# Patient Record
Sex: FEMALE | Race: WHITE | Employment: FULL TIME | URBAN - NONMETROPOLITAN AREA
[De-identification: names, ages, dates, MRNs, and addresses within clinical notes are randomized per-mention and may not be internally consistent; named-entity substitution may affect disease eponyms.]

---

## 2019-03-26 ENCOUNTER — TELEPHONE (OUTPATIENT)
Dept: SURGERY | Age: 49
End: 2019-03-26

## 2019-03-26 NOTE — TELEPHONE ENCOUNTER
Called patient to schedule appt. Ref from her employer for gallbladder. Left message, [facility]. Put with any provider.

## 2020-01-21 ENCOUNTER — OFFICE VISIT (OUTPATIENT)
Dept: FAMILY MEDICINE CLINIC | Age: 50
End: 2020-01-21
Payer: COMMERCIAL

## 2020-01-21 VITALS
BODY MASS INDEX: 24.17 KG/M2 | WEIGHT: 128 LBS | OXYGEN SATURATION: 97 % | TEMPERATURE: 98.2 F | SYSTOLIC BLOOD PRESSURE: 110 MMHG | HEIGHT: 61 IN | HEART RATE: 75 BPM | RESPIRATION RATE: 18 BRPM | DIASTOLIC BLOOD PRESSURE: 68 MMHG

## 2020-01-21 PROCEDURE — 96372 THER/PROPH/DIAG INJ SC/IM: CPT | Performed by: NURSE PRACTITIONER

## 2020-01-21 PROCEDURE — G8427 DOCREV CUR MEDS BY ELIG CLIN: HCPCS | Performed by: NURSE PRACTITIONER

## 2020-01-21 PROCEDURE — 1036F TOBACCO NON-USER: CPT | Performed by: NURSE PRACTITIONER

## 2020-01-21 PROCEDURE — G8484 FLU IMMUNIZE NO ADMIN: HCPCS | Performed by: NURSE PRACTITIONER

## 2020-01-21 PROCEDURE — G8420 CALC BMI NORM PARAMETERS: HCPCS | Performed by: NURSE PRACTITIONER

## 2020-01-21 PROCEDURE — 99202 OFFICE O/P NEW SF 15 MIN: CPT | Performed by: NURSE PRACTITIONER

## 2020-01-21 RX ORDER — OMEPRAZOLE 20 MG/1
20 CAPSULE, DELAYED RELEASE ORAL DAILY
COMMUNITY
Start: 2019-02-26

## 2020-01-21 RX ORDER — SUMATRIPTAN 100 MG/1
100 TABLET, FILM COATED ORAL
COMMUNITY
Start: 2019-12-14 | End: 2022-07-21 | Stop reason: SDUPTHER

## 2020-01-21 RX ORDER — KETOROLAC TROMETHAMINE 30 MG/ML
30 INJECTION, SOLUTION INTRAMUSCULAR; INTRAVENOUS ONCE
Status: COMPLETED | OUTPATIENT
Start: 2020-01-21 | End: 2020-01-21

## 2020-01-21 RX ORDER — METHOCARBAMOL 750 MG/1
TABLET ORAL
COMMUNITY
Start: 2020-01-18 | End: 2022-05-05

## 2020-01-21 RX ADMIN — KETOROLAC TROMETHAMINE 30 MG: 30 INJECTION, SOLUTION INTRAMUSCULAR; INTRAVENOUS at 08:43

## 2020-01-21 ASSESSMENT — ENCOUNTER SYMPTOMS
EYE DISCHARGE: 0
TROUBLE SWALLOWING: 0
VOMITING: 0
BLOOD IN STOOL: 0
SORE THROAT: 0
COLOR CHANGE: 0
CONSTIPATION: 0
SHORTNESS OF BREATH: 0
ABDOMINAL PAIN: 1
DIARRHEA: 0
ABDOMINAL DISTENTION: 0
NAUSEA: 1
COUGH: 0
RHINORRHEA: 0
EYE REDNESS: 0

## 2020-01-21 ASSESSMENT — PATIENT HEALTH QUESTIONNAIRE - PHQ9
SUM OF ALL RESPONSES TO PHQ QUESTIONS 1-9: 0
1. LITTLE INTEREST OR PLEASURE IN DOING THINGS: 0
SUM OF ALL RESPONSES TO PHQ9 QUESTIONS 1 & 2: 0
2. FEELING DOWN, DEPRESSED OR HOPELESS: 0
SUM OF ALL RESPONSES TO PHQ QUESTIONS 1-9: 0

## 2020-01-21 NOTE — LETTER
[address] [place] [address] [address] [place] [address]  Phone: [phone]  Fax: [phone] VANE Aggarwal - CNP        January 21, 2020     Patient: Joan Mckeon   YOB: 1970   Date of Visit: 1/21/2020       To Whom it May Concern:    Kt Hein was seen in my clinic on 1/21/2020. If you have any questions or concerns, please don't hesitate to call.     Sincerely,         VANE Mccormick CNP

## 2022-05-05 ENCOUNTER — OFFICE VISIT (OUTPATIENT)
Dept: FAMILY MEDICINE CLINIC | Age: 52
End: 2022-05-05
Payer: COMMERCIAL

## 2022-05-05 VITALS
DIASTOLIC BLOOD PRESSURE: 62 MMHG | OXYGEN SATURATION: 98 % | BODY MASS INDEX: 23.82 KG/M2 | RESPIRATION RATE: 16 BRPM | SYSTOLIC BLOOD PRESSURE: 122 MMHG | WEIGHT: 124 LBS | HEART RATE: 94 BPM | TEMPERATURE: 97.3 F

## 2022-05-05 DIAGNOSIS — Z13.220 SCREENING, LIPID: ICD-10-CM

## 2022-05-05 DIAGNOSIS — R10.11 RIGHT UPPER QUADRANT PAIN: Primary | ICD-10-CM

## 2022-05-05 DIAGNOSIS — Z11.59 ENCOUNTER FOR HEPATITIS C SCREENING TEST FOR LOW RISK PATIENT: ICD-10-CM

## 2022-05-05 DIAGNOSIS — Z11.4 SCREENING FOR HIV (HUMAN IMMUNODEFICIENCY VIRUS): ICD-10-CM

## 2022-05-05 PROBLEM — F32.A DEPRESSION: Status: ACTIVE | Noted: 2022-05-05

## 2022-05-05 PROBLEM — K83.9 BILIARY DISEASE: Status: ACTIVE | Noted: 2022-05-05

## 2022-05-05 PROBLEM — F41.9 ANXIETY: Status: ACTIVE | Noted: 2022-05-05

## 2022-05-05 PROBLEM — R12 HEARTBURN: Status: ACTIVE | Noted: 2022-05-05

## 2022-05-05 PROBLEM — G43.009 MIGRAINE WITHOUT AURA AND WITHOUT STATUS MIGRAINOSUS, NOT INTRACTABLE: Status: ACTIVE | Noted: 2022-05-05

## 2022-05-05 LAB
BASOPHILS # BLD: 1.7 %
BASOPHILS ABSOLUTE: 0.1 THOU/MM3 (ref 0–0.1)
CHOLESTEROL, TOTAL: 178 MG/DL (ref 100–199)
EOSINOPHIL # BLD: 1.1 %
EOSINOPHILS ABSOLUTE: 0.1 THOU/MM3 (ref 0–0.4)
ERYTHROCYTE [DISTWIDTH] IN BLOOD BY AUTOMATED COUNT: 14.6 % (ref 11.5–14.5)
ERYTHROCYTE [DISTWIDTH] IN BLOOD BY AUTOMATED COUNT: 39.8 FL (ref 35–45)
HCT VFR BLD CALC: 33.3 % (ref 37–47)
HDLC SERPL-MCNC: 77 MG/DL
HEMOGLOBIN: 9.7 GM/DL (ref 12–16)
IMMATURE GRANS (ABS): 0.01 THOU/MM3 (ref 0–0.07)
IMMATURE GRANULOCYTES: 0.2 %
LDL CHOLESTEROL CALCULATED: 74 MG/DL
LYMPHOCYTES # BLD: 35.6 %
LYMPHOCYTES ABSOLUTE: 1.9 THOU/MM3 (ref 1–4.8)
MCH RBC QN AUTO: 22.1 PG (ref 26–33)
MCHC RBC AUTO-ENTMCNC: 29.1 GM/DL (ref 32.2–35.5)
MCV RBC AUTO: 76 FL (ref 81–99)
MONOCYTES # BLD: 9.2 %
MONOCYTES ABSOLUTE: 0.5 THOU/MM3 (ref 0.4–1.3)
NUCLEATED RED BLOOD CELLS: 0 /100 WBC
PLATELET # BLD: 438 THOU/MM3 (ref 130–400)
PMV BLD AUTO: 10.9 FL (ref 9.4–12.4)
RBC # BLD: 4.38 MILL/MM3 (ref 4.2–5.4)
SEG NEUTROPHILS: 52.2 %
SEGMENTED NEUTROPHILS ABSOLUTE COUNT: 2.7 THOU/MM3 (ref 1.8–7.7)
TRIGL SERPL-MCNC: 134 MG/DL (ref 0–199)
WBC # BLD: 5.2 THOU/MM3 (ref 4.8–10.8)

## 2022-05-05 PROCEDURE — 1036F TOBACCO NON-USER: CPT | Performed by: FAMILY MEDICINE

## 2022-05-05 PROCEDURE — G8420 CALC BMI NORM PARAMETERS: HCPCS | Performed by: FAMILY MEDICINE

## 2022-05-05 PROCEDURE — 96372 THER/PROPH/DIAG INJ SC/IM: CPT | Performed by: FAMILY MEDICINE

## 2022-05-05 PROCEDURE — 36415 COLL VENOUS BLD VENIPUNCTURE: CPT | Performed by: FAMILY MEDICINE

## 2022-05-05 PROCEDURE — G8427 DOCREV CUR MEDS BY ELIG CLIN: HCPCS | Performed by: FAMILY MEDICINE

## 2022-05-05 PROCEDURE — 99214 OFFICE O/P EST MOD 30 MIN: CPT | Performed by: FAMILY MEDICINE

## 2022-05-05 RX ORDER — KETOROLAC TROMETHAMINE 30 MG/ML
60 INJECTION, SOLUTION INTRAMUSCULAR; INTRAVENOUS ONCE
Status: COMPLETED | OUTPATIENT
Start: 2022-05-05 | End: 2022-05-05

## 2022-05-05 RX ORDER — KETOROLAC TROMETHAMINE 10 MG/1
10 TABLET, FILM COATED ORAL EVERY 6 HOURS PRN
Qty: 20 TABLET | Refills: 0 | Status: SHIPPED | OUTPATIENT
Start: 2022-05-05 | End: 2022-07-21

## 2022-05-05 RX ADMIN — KETOROLAC TROMETHAMINE 60 MG: 30 INJECTION, SOLUTION INTRAMUSCULAR; INTRAVENOUS at 15:22

## 2022-05-05 ASSESSMENT — PATIENT HEALTH QUESTIONNAIRE - PHQ9
SUM OF ALL RESPONSES TO PHQ9 QUESTIONS 1 & 2: 0
SUM OF ALL RESPONSES TO PHQ QUESTIONS 1-9: 0
2. FEELING DOWN, DEPRESSED OR HOPELESS: 0
SUM OF ALL RESPONSES TO PHQ QUESTIONS 1-9: 0
1. LITTLE INTEREST OR PLEASURE IN DOING THINGS: 0

## 2022-05-05 ASSESSMENT — ENCOUNTER SYMPTOMS
SINUS PAIN: 0
BACK PAIN: 0
SHORTNESS OF BREATH: 0
ABDOMINAL PAIN: 1
BLOOD IN STOOL: 0
NAUSEA: 0
SINUS PRESSURE: 0
ABDOMINAL DISTENTION: 0
VOMITING: 0
DIARRHEA: 0
RHINORRHEA: 0
COUGH: 0
SORE THROAT: 0
WHEEZING: 0
CONSTIPATION: 0

## 2022-05-05 NOTE — PROGRESS NOTES
After obtaining consent, and per orders of Dr. Erlin Chairez, injection of Toradol 60mg given in Left upper quad. gluteus by Dann Ledesma CMA. Patient instructed to report any adverse reaction to me immediately. Administrations This Visit     ketorolac (TORADOL) injection 60 mg     Admin Date  05/05/2022  15:22 Action  Given Dose  60 mg Route  IntraMUSCular Site  Dorsogluteal Left Administered By  Nela Lara    Ordering Provider: Felix Lai MD    NDC: 9182-3252-22    Lot#: [id number]    : VINITA/ [place] 9    Patient Supplied?: No              Patient filled out VIS checklist and received VIS on vaccine. Patient tolerated well and denied any other questions or concerns at this time.

## 2022-05-05 NOTE — PROGRESS NOTES
Sydney Jorge (:  1970) is a 54 y.o. female,New patient, here for evaluation of the following chief complaint(s):  Abdominal Pain (right side radiates across stomach. getting worse throughout the day)         ASSESSMENT/PLAN:  1. Right upper quadrant pain  -     US GALLBLADDER RUQ; Future  -     CBC with Auto Differential; Future  -     Comprehensive Metabolic Panel; Future  -     Lipase; Future  -     ketorolac (TORADOL) 10 MG tablet; Take 1 tablet by mouth every 6 hours as needed for Pain, Disp-20 tablet, R-0Normal  -     ketorolac (TORADOL) injection 60 mg; 60 mg, IntraMUSCular, ONCE, 1 dose, On Thu 22 at 1530Do not administer for more than 5 days. 2. Screening, lipid  -     Lipid Panel; Future  3. Encounter for hepatitis C screening test for low risk patient  -     Hepatitis C Antibody; Future  4. Screening for HIV (human immunodeficiency virus)  -     HIV Screen; Future      Will send patient for stat RUQ ultrasound and get labs stat as well. Will give Toradol injection and start toradol 10mg q6h prn for pain. Return in about 1 week (around 2022) for follow up. Subjective   SUBJECTIVE/OBJECTIVE:  HPI     Patient presents today for walk-in for abdominal pain. CT 4/15 showed no acute process in abdomen or pelvis, unremarkable gallbladder and appendix, no urolithiasis or urinary tract dilatation, left hepatic hemangioma unchanged, moderate-sized hiatal hernia, status post hysterectomy and gastric bypass surgery. Additionally was seen in the ED at [facility] on  for abdominal pain but left without being seen. States in process of gall bladder attack and she wants to get ahead of it. She had gall bladder test done and they said it was only working about 30%. She went to surgeon who said they wanted to wait due to her previous surgeries. Past three weeks this is the second time it's happened.  She states it's been colicky off and on but not like the spasms she's having now. She has tried bentyl but it doesn't. Pain is always in right upper side. When it goes into full spasm it shoots across her stomach. 3 weeks ago she got shot of toradol in [facility] and pain subsided. Is interested in getting referral to get another opinion on surgery. Had gastric bypass in [place]. States she is in between family docs, would like to establish. Usually goes to [facility]. Thinks last time she got labwork done was last year (yearly labs for work). Review of Systems   Constitutional: Negative for chills and fever. HENT: Negative for congestion, rhinorrhea, sinus pressure, sinus pain and sore throat. Respiratory: Negative for cough, shortness of breath and wheezing. Cardiovascular: Negative for chest pain and leg swelling. Gastrointestinal: Positive for abdominal pain. Negative for abdominal distention, blood in stool, constipation, diarrhea, nausea and vomiting. Genitourinary: Negative for dysuria, frequency, hematuria and urgency. Musculoskeletal: Negative for arthralgias, back pain and myalgias. Skin: Negative for rash. Neurological: Negative for dizziness, light-headedness and headaches. Psychiatric/Behavioral: Negative for dysphoric mood and sleep disturbance. Objective   Physical Exam  Constitutional:       General: She is not in acute distress. Appearance: Normal appearance. She is not ill-appearing. HENT:      Head: Normocephalic and atraumatic. Right Ear: External ear normal.      Left Ear: External ear normal.      Nose: Nose normal.      Mouth/Throat:      Mouth: Mucous membranes are moist.      Pharynx: Oropharynx is clear. Eyes:      Conjunctiva/sclera: Conjunctivae normal.   Cardiovascular:      Rate and Rhythm: Normal rate and regular rhythm. Pulses: Normal pulses. Heart sounds: Normal heart sounds. No murmur heard.       Pulmonary:      Effort: Pulmonary effort is normal. No respiratory distress. Breath sounds: Normal breath sounds. No wheezing. Abdominal:      General: Abdomen is flat. Bowel sounds are normal. There is no distension. Palpations: Abdomen is soft. There is no hepatomegaly or splenomegaly. Tenderness: There is generalized abdominal tenderness and tenderness in the right upper quadrant, epigastric area and left upper quadrant. There is guarding. There is no rebound. Positive signs include Shaw's sign. Negative signs include McBurney's sign. Musculoskeletal:         General: Normal range of motion. Cervical back: Normal range of motion and neck supple. Skin:     General: Skin is warm. Neurological:      General: No focal deficit present. Mental Status: She is alert. Mental status is at baseline. Psychiatric:         Mood and Affect: Mood normal.              An electronic signature was used to authenticate this note.     --Joceline Moise MD

## 2022-05-05 NOTE — PROGRESS NOTES
Attending attestation:  I personally performed and participated key or critical portions of the evaluation and management including personally performing the exam and medical decision making. I verify the accuracy of the documentation by the resident with the following addition or changes:  Chronic problem with acute exacerbation. Used to see GI in [facility] and had a HIDA scan. Reportedly came back at 35%. Was referred to a surgeon Dr. Wilburn Severin in [facility] but because of multiple stomach surgeries they started antispasm pills instead. Now in the last 3 weeks getting back to back attacks. This is unusual. Toradol in ER helped once. Pain currently is 4-8/10. At the moment is closer to 4/10. No fevers. No jaundice. Stooling normal. No vomiting. Interested in surgery if possible. Did have history of migraines and ocular migraines. Abdomen is tender in right upper quadrant with positive Shaw sign but without rebound or guarding. Vital signs reassuring. Symptoms acute on chronic. Will check stat labs and ultrasound. Give toradol for pain. Close follow-up. Addendum: Labs and imaging back. Chatted with patient. She reports chronic anemia after gastric bypass which resolves with supplements and recurs with stopping. She is feeling better. Tolerating food. No fevers. Belly pain improved. Normal stooling. No vomiting. No history of pancreatitis. Normal ultrasound right upper quadrant; was called by [facility] with results after I reached out to their radiology department. Discussed options. Will test for h pylori. Update colon cancer screening. Will start vitamin supplements for post bariatric surgery. Emailed patient recommendations from [employer]To[employer]. Consider follow-up with surgeon for possible elective cholecystectomy for gallbladder spasms given history of abnormal HIDA. Close follow-up as above.         Electronically signed by Marlen Stanford MD on 5/5/2022 at 2:49 PM

## 2022-05-06 LAB
ALBUMIN SERPL-MCNC: 4.2 G/DL (ref 3.5–5.1)
ALP BLD-CCNC: 98 U/L (ref 38–126)
ALT SERPL-CCNC: 32 U/L (ref 11–66)
ANION GAP SERPL CALCULATED.3IONS-SCNC: 9 MEQ/L (ref 8–16)
AST SERPL-CCNC: 30 U/L (ref 5–40)
BILIRUB SERPL-MCNC: 0.3 MG/DL (ref 0.3–1.2)
BUN BLDV-MCNC: 14 MG/DL (ref 7–22)
CALCIUM SERPL-MCNC: 8.8 MG/DL (ref 8.5–10.5)
CHLORIDE BLD-SCNC: 106 MEQ/L (ref 98–111)
CO2: 24 MEQ/L (ref 23–33)
CREAT SERPL-MCNC: 0.5 MG/DL (ref 0.4–1.2)
GFR SERPL CREATININE-BSD FRML MDRD: > 90 ML/MIN/1.73M2
GLUCOSE BLD-MCNC: 92 MG/DL (ref 70–108)
HEPATITIS C ANTIBODY: NEGATIVE
HIV AG/AB: NONREACTIVE
LIPASE: 79.6 U/L (ref 5.6–51.3)
POTASSIUM SERPL-SCNC: 4.2 MEQ/L (ref 3.5–5.2)
SODIUM BLD-SCNC: 139 MEQ/L (ref 135–145)
TOTAL PROTEIN: 7.2 G/DL (ref 6.1–8)

## 2022-05-07 DIAGNOSIS — R10.13 DYSPEPSIA: Primary | ICD-10-CM

## 2022-05-07 DIAGNOSIS — D50.8 IRON DEFICIENCY ANEMIA SECONDARY TO INADEQUATE DIETARY IRON INTAKE: ICD-10-CM

## 2022-05-07 DIAGNOSIS — Z12.11 COLON CANCER SCREENING: ICD-10-CM

## 2022-05-09 DIAGNOSIS — R10.11 RIGHT UPPER QUADRANT PAIN: ICD-10-CM

## 2022-05-10 ENCOUNTER — TELEPHONE (OUTPATIENT)
Dept: FAMILY MEDICINE CLINIC | Age: 52
End: 2022-05-10

## 2022-05-10 ENCOUNTER — OFFICE VISIT (OUTPATIENT)
Dept: FAMILY MEDICINE CLINIC | Age: 52
End: 2022-05-10
Payer: COMMERCIAL

## 2022-05-10 VITALS
TEMPERATURE: 98.4 F | OXYGEN SATURATION: 98 % | RESPIRATION RATE: 16 BRPM | WEIGHT: 120.6 LBS | BODY MASS INDEX: 23.17 KG/M2 | SYSTOLIC BLOOD PRESSURE: 116 MMHG | DIASTOLIC BLOOD PRESSURE: 70 MMHG | HEART RATE: 77 BPM

## 2022-05-10 DIAGNOSIS — Z23 NEED FOR TDAP VACCINATION: ICD-10-CM

## 2022-05-10 DIAGNOSIS — L70.0 ACNE VULGARIS: Primary | ICD-10-CM

## 2022-05-10 DIAGNOSIS — D50.8 OTHER IRON DEFICIENCY ANEMIA: ICD-10-CM

## 2022-05-10 DIAGNOSIS — Z98.84 STATUS POST GASTRIC BYPASS FOR OBESITY: ICD-10-CM

## 2022-05-10 DIAGNOSIS — K82.9 GALLBLADDER ATTACK: ICD-10-CM

## 2022-05-10 PROCEDURE — G8427 DOCREV CUR MEDS BY ELIG CLIN: HCPCS | Performed by: FAMILY MEDICINE

## 2022-05-10 PROCEDURE — 90471 IMMUNIZATION ADMIN: CPT | Performed by: FAMILY MEDICINE

## 2022-05-10 PROCEDURE — 99214 OFFICE O/P EST MOD 30 MIN: CPT | Performed by: FAMILY MEDICINE

## 2022-05-10 PROCEDURE — G8420 CALC BMI NORM PARAMETERS: HCPCS | Performed by: FAMILY MEDICINE

## 2022-05-10 PROCEDURE — 1036F TOBACCO NON-USER: CPT | Performed by: FAMILY MEDICINE

## 2022-05-10 PROCEDURE — 90715 TDAP VACCINE 7 YRS/> IM: CPT | Performed by: FAMILY MEDICINE

## 2022-05-10 RX ORDER — DOXYCYCLINE HYCLATE 100 MG
100 TABLET ORAL 2 TIMES DAILY
Qty: 180 TABLET | Refills: 0 | Status: SHIPPED | OUTPATIENT
Start: 2022-05-10 | End: 2022-05-20

## 2022-05-10 RX ORDER — FERROUS SULFATE 325(65) MG
325 TABLET ORAL
Qty: 90 TABLET | Refills: 1 | Status: SHIPPED | OUTPATIENT
Start: 2022-05-10 | End: 2022-08-11 | Stop reason: SDUPTHER

## 2022-05-10 ASSESSMENT — ENCOUNTER SYMPTOMS
SINUS PAIN: 0
SHORTNESS OF BREATH: 0
COUGH: 0
NAUSEA: 0
SORE THROAT: 0
WHEEZING: 0
BACK PAIN: 0
ABDOMINAL DISTENTION: 0
VOMITING: 0
DIARRHEA: 0
BLOOD IN STOOL: 0
ABDOMINAL PAIN: 0
SINUS PRESSURE: 0
CONSTIPATION: 0
RHINORRHEA: 0

## 2022-05-10 NOTE — PROGRESS NOTES
Health Maintenance Due   Topic Date Due    DTaP/Tdap/Td vaccine (1 - Tdap) Never done- declines    Colorectal Cancer Screen  Never done    COVID-19 Vaccine (3 - Booster for Moderna series) 09/12/2021 - due

## 2022-05-10 NOTE — PROGRESS NOTES
After obtaining consent, and per orders of Dr. Jc Marmolejo, injection of Tdap given in Right deltoid by Sugar Jarrell MA. Patient instructed to remain in clinic for 20 minutes afterwards, and to report any adverse reaction to me immediately. Immunizations Administered     Name Date Dose Route    Tdap (Boostrix, Adacel) 5/10/2022 0.5 mL Intramuscular    Site: Deltoid- Left    Lot: [id number]    NDC: 11133-817-79        Vaccine checklist completed and scanned.  Patient tolerated injection well

## 2022-05-10 NOTE — PROGRESS NOTES
[address] [place] [address]  Dept: [phone]  Dept Fax: [phone]  Loc: [phone]      Jia Romo is a 54 y.o. female who presents todayfor her medical conditions/complaints as noted below. Jia Romo is c/o of Follow-up      :     HPI     Did get list of supplements. We can print also. Did start a multivitamin for 50 plus and start an iron supplement. Symptoms are largely gone. Occasional abdominal ache but none today. Does get acne on back not clearing with wash. Picks at this. Antibiotics worked well in the past.      Skin lesion excised in her 19's thinks melanoma in situ. Will give collection container for h pylori. Dicussed abdominal spasms. Will repeat HIDA. Patient Active Problem List   Diagnosis    Anxiety    Depression    Heartburn    Migraine without aura and without status migrainosus, not intractable    Biliary disease     Goals    None       The patient is allergic to morphine. Medical History  Kendra has a past medical history of Anxiety, Biliary disease, Depression, Heartburn, and Migraine without aura and without status migrainosus, not intractable. Past SurgicalHistory  The patient  has a past surgical history that includes Gastric bypass surgery; Hysterectomy, vaginal; Dilation and curettage of uterus; Abdominoplasty (12/2019); and Skin cancer excision. Family History  This patient's family history is not on file. Social History  Kendra  reports that she quit smoking about 3 years ago. She has never used smokeless tobacco. She reports current alcohol use. She reports that she does not use drugs.     Medications    Current Outpatient Medications:     doxycycline hyclate (VIBRA-TABS) 100 MG tablet, Take 1 tablet by mouth 2 times daily for 10 days, Disp: 180 tablet, Rfl: 0    ferrous sulfate (IRON 325) 325 (65 Fe) MG tablet, Take 1 tablet by mouth daily (with breakfast), Disp: 90 tablet, Rfl: 1    ketorolac (TORADOL) 10 MG tablet, Take 1 tablet by mouth every 6 hours as needed for Pain, Disp: 20 tablet, Rfl: 0    SUMAtriptan (IMITREX) 100 MG tablet, , Disp: , Rfl:     omeprazole (PRILOSEC) 20 MG delayed release capsule, Take 1 daily, Disp: , Rfl:     Subjective:      Review of Systems   Constitutional: Negative for chills and fever. HENT: Negative for congestion, rhinorrhea, sinus pressure, sinus pain and sore throat. Respiratory: Negative for cough, shortness of breath and wheezing. Cardiovascular: Negative for chest pain and leg swelling. Gastrointestinal: Negative for abdominal distention, abdominal pain, blood in stool, constipation, diarrhea, nausea and vomiting. Genitourinary: Negative for dysuria, frequency, hematuria and urgency. Musculoskeletal: Negative for arthralgias, back pain and myalgias. Skin: Negative for rash. Neurological: Negative for dizziness, light-headedness and headaches. Psychiatric/Behavioral: Negative for dysphoric mood and sleep disturbance. Objective:     Vitals:    05/10/22 1238   BP: 116/70   Site: Left Upper Arm   Pulse: 77   Resp: 16   Temp: 98.4 °F (36.9 °C)   TempSrc: Skin   SpO2: 98%   Weight: 120 lb 9.6 oz (54.7 kg)       Physical Exam  Vitals reviewed. Constitutional:       General: She is not in acute distress. Appearance: She is well-developed. HENT:      Head: Normocephalic and atraumatic. Eyes:      Conjunctiva/sclera: Conjunctivae normal.   Cardiovascular:      Rate and Rhythm: Normal rate and regular rhythm. Heart sounds: Normal heart sounds. Pulmonary:      Effort: Pulmonary effort is normal. No respiratory distress. Breath sounds: Normal breath sounds. Abdominal:      General: Bowel sounds are normal. There is no distension. Palpations: Abdomen is soft. There is no mass. Tenderness: There is abdominal tenderness. There is no guarding or rebound. Hernia: No hernia is present. Comments: Tender diffusely over abdomen. Especially upper abdomen. Musculoskeletal:      Cervical back: Neck supple. Skin:     General: Skin is warm and dry. Comments: Rash with multiple red papules on back scratched open and scabbed with chronic scarring. Excision site on left shoulder with scar without any evidence of skin lesion recurrence. Neurological:      Mental Status: She is alert. Comments: No obvious focal deficit. Psychiatric:         Behavior: Behavior normal.         Lab Results   Component Value Date    WBC 5.2 05/05/2022    HGB 9.7 (L) 05/05/2022    HCT 33.3 (L) 05/05/2022     (H) 05/05/2022    CHOL 178 05/05/2022    TRIG 134 05/05/2022    HDL 77 05/05/2022    ALT 32 05/05/2022    AST 30 05/05/2022     05/05/2022    K 4.2 05/05/2022     05/05/2022    CREATININE 0.5 05/05/2022    BUN 14 05/05/2022    CO2 24 05/05/2022       /Plan:   1. Gallbladder attack  Chronic stable. Discussed options. Attack is past and patient is not really sure she is interested in operative intervention. Will check HIDA scan since none recent. Follow. Indications for prompt return and/or emergent presentation if worsening reviewed in detail.    - [place] HEPATOBILIARY [address]; Future    2. Acne vulgaris  New issue. Diffuse and in locations where cream application would be difficult. Will start doxycycline.    - doxycycline hyclate (VIBRA-TABS) 100 MG tablet; Take 1 tablet by mouth 2 times daily for 10 days  Dispense: 180 tablet; Refill: 0    3. Other iron deficiency anemia  Newly diagnosed but reporting comes and goes with appropriate supplement use. Will start iron and repeat labs in 3 months. Also checking h pylori to exclude gastric ulcer as contributory. I suspect that nutritional deficiency in patient post gastric bypass not on supplements is the cause.   Indications for prompt return and/or emergent presentation if worsening reviewed in detail.     - Iron; Future  - Iron Saturation; Future  - Iron Binding Capacity; Future  - Ferritin; Future  - Transferrin; Future  - ferrous sulfate (IRON 325) 325 (65 Fe) MG tablet; Take 1 tablet by mouth daily (with breakfast)  Dispense: 90 tablet; Refill: 1    4. Status post gastric bypass for obesity  Printed a sheet from [facility] with recommended supplement doses for post gastric bypass. Will start these and re-check levels in 3 months.    - Vitamin B12 & Folate; Future  - Vitamin B1; Future  - Vitamin A; Future  - Vitamin D 25 Hydroxy; Future  - Vitamin E; Future  - Vitamin K1; Future  - Basic Metabolic Panel; Future  - CBC with Auto Differential; Future  - Iron; Future  - Iron Saturation; Future  - Iron Binding Capacity; Future  - Ferritin; Future  - Transferrin; Future  - Zinc; Future  - Copper, Serum; Future  - Selenium Serum; Future    5. Need for Tdap vaccination  Given.    - Tdap (age 6y and older)  ([address])    Cologuard enroute for screening. Return in about 3 months (around 8/10/2022) for Follow-up chronic conditions.     Orders Placed   Orders Placed This Encounter   Procedures    NM HEPATOBILIARY SCAN W EJECTION FRACTION     Standing Status:   Future     Standing Expiration Date:   5/10/2023     Order Specific Question:   Reason for exam:     Answer:   history of abnormal HIDA remotely and gallbladder spasms; repeat to guide managment    Tdap (age 6y and older) IM ([address])    Vitamin B12 & Folate     Standing Status:   Future     Standing Expiration Date:   5/10/2023    Vitamin B1     Standing Status:   Future     Standing Expiration Date:   5/10/2023    Vitamin A     Standing Status:   Future     Standing Expiration Date:   5/10/2023    Vitamin D 25 Hydroxy     Standing Status:   Future     Standing Expiration Date:   5/10/2023    Vitamin E     Standing Status:   Future     Standing Expiration Date:   5/10/2023    Vitamin K1     Standing Status:   Future     Standing Expiration Date:   5/10/2023    Basic Metabolic Panel     Standing Status:   Future     Standing Expiration Date:   5/10/2023    CBC with Auto Differential     Standing Status:   Future     Standing Expiration Date:   5/10/2023    Iron     Standing Status:   Future     Standing Expiration Date:   5/10/2023     Order Specific Question:   Is Patient Fasting? Answer:   0     Order Specific Question:   No of Hours? Answer:   0    Iron Saturation     Standing Status:   Future     Standing Expiration Date:   5/10/2023    Iron Binding Capacity     Standing Status:   Future     Standing Expiration Date:   5/10/2023    Ferritin     Standing Status:   Future     Standing Expiration Date:   5/10/2023    Transferrin     Standing Status:   Future     Standing Expiration Date:   5/10/2023    Zinc     Standing Status:   Future     Standing Expiration Date:   5/10/2023    Copper, Serum     Standing Status:   Future     Standing Expiration Date:   5/10/2023    Selenium Serum     Standing Status:   Future     Standing Expiration Date:   5/10/2023       Prescriptions given/sent  Orders Placed This Encounter   Medications    doxycycline hyclate (VIBRA-TABS) 100 MG tablet     Sig: Take 1 tablet by mouth 2 times daily for 10 days     Dispense:  180 tablet     Refill:  0    ferrous sulfate (IRON 325) 325 (65 Fe) MG tablet     Sig: Take 1 tablet by mouth daily (with breakfast)     Dispense:  90 tablet     Refill:  1       Patient given educational materials - see patient instructions. Discussed use, benefit, and side effects of recommended medications. All patient questions answered. Pt voiced understanding. Reviewed health maintenance; encouraged COVID-19 booster at pharmacy.              Electronically signed by Colleen Riojas MD on 5/10/2022 at 1:25 PM

## 2022-05-10 NOTE — TELEPHONE ENCOUNTER
Aware. Can come in or we can send her a copy. Can we send this via my chart for electronic signature?

## 2022-05-21 LAB
ALBUMIN SERPL-MCNC: 3.9 G/DL
ALP BLD-CCNC: 75 U/L
ALT SERPL-CCNC: 21 U/L
ANION GAP SERPL CALCULATED.3IONS-SCNC: 14 MMOL/L
AST SERPL-CCNC: 26 U/L
BASOPHILS ABSOLUTE: 0.1 /ΜL
BASOPHILS RELATIVE PERCENT: 2 %
BILIRUB SERPL-MCNC: 0.2 MG/DL (ref 0.1–1.4)
BUN BLDV-MCNC: 14 MG/DL
CALCIUM SERPL-MCNC: 8.9 MG/DL
CHLORIDE BLD-SCNC: 104 MMOL/L
CO2: 22 MMOL/L
CREAT SERPL-MCNC: 0.45 MG/DL
EOSINOPHILS ABSOLUTE: 0.1 /ΜL
EOSINOPHILS RELATIVE PERCENT: 1.3 %
GFR CALCULATED: >60
GLUCOSE BLD-MCNC: 103 MG/DL
HCT VFR BLD CALC: 31.7 % (ref 36–46)
HEMOGLOBIN: 10 G/DL (ref 12–16)
LYMPHOCYTES ABSOLUTE: 3.3 /ΜL
LYMPHOCYTES RELATIVE PERCENT: 51.2 %
MCH RBC QN AUTO: 22.3 PG
MCHC RBC AUTO-ENTMCNC: 31.4 G/DL
MCV RBC AUTO: 71 FL
MONOCYTES ABSOLUTE: 0.5 /ΜL
MONOCYTES RELATIVE PERCENT: 8 %
NEUTROPHILS ABSOLUTE: 2.4 /ΜL
NEUTROPHILS RELATIVE PERCENT: 37.5 %
PDW BLD-RTO: 16.3 %
PLATELET # BLD: 418 K/ΜL
PMV BLD AUTO: 8 FL
POTASSIUM SERPL-SCNC: 3.4 MMOL/L
RBC # BLD: 4.47 10^6/ΜL
SODIUM BLD-SCNC: 137 MMOL/L
TOTAL PROTEIN: 7.1
WBC # BLD: 6.4 10^3/ML

## 2022-05-25 ENCOUNTER — TELEPHONE (OUTPATIENT)
Dept: FAMILY MEDICINE CLINIC | Age: 52
End: 2022-05-25

## 2022-05-26 ENCOUNTER — HOSPITAL ENCOUNTER (OUTPATIENT)
Dept: NUCLEAR MEDICINE | Age: 52
Discharge: HOME | End: 2022-05-26
Payer: COMMERCIAL

## 2022-05-26 ENCOUNTER — OFFICE VISIT (OUTPATIENT)
Dept: FAMILY MEDICINE CLINIC | Age: 52
End: 2022-05-26
Payer: COMMERCIAL

## 2022-05-26 VITALS
DIASTOLIC BLOOD PRESSURE: 76 MMHG | HEART RATE: 88 BPM | OXYGEN SATURATION: 98 % | RESPIRATION RATE: 14 BRPM | WEIGHT: 120.6 LBS | BODY MASS INDEX: 23.17 KG/M2 | SYSTOLIC BLOOD PRESSURE: 110 MMHG | TEMPERATURE: 97.7 F

## 2022-05-26 DIAGNOSIS — K63.9 COLON WALL THICKENING: ICD-10-CM

## 2022-05-26 DIAGNOSIS — R93.5 ABNORMAL CT OF THE ABDOMEN: ICD-10-CM

## 2022-05-26 DIAGNOSIS — R94.8 ABNORMAL BILIARY HIDA SCAN: Primary | ICD-10-CM

## 2022-05-26 DIAGNOSIS — K82.9 GALLBLADDER ATTACK: ICD-10-CM

## 2022-05-26 PROCEDURE — 78226 HEPATOBILIARY SYSTEM IMAGING: CPT

## 2022-05-26 PROCEDURE — 3430000000 HC RX DIAGNOSTIC RADIOPHARMACEUTICAL: Performed by: FAMILY MEDICINE

## 2022-05-26 PROCEDURE — G8420 CALC BMI NORM PARAMETERS: HCPCS | Performed by: NURSE PRACTITIONER

## 2022-05-26 PROCEDURE — G8427 DOCREV CUR MEDS BY ELIG CLIN: HCPCS | Performed by: NURSE PRACTITIONER

## 2022-05-26 PROCEDURE — 99214 OFFICE O/P EST MOD 30 MIN: CPT | Performed by: NURSE PRACTITIONER

## 2022-05-26 PROCEDURE — A9537 TC99M MEBROFENIN: HCPCS | Performed by: FAMILY MEDICINE

## 2022-05-26 PROCEDURE — 1036F TOBACCO NON-USER: CPT | Performed by: NURSE PRACTITIONER

## 2022-05-26 RX ADMIN — Medication 6.5 MILLICURIE: at 11:55

## 2022-05-26 NOTE — TELEPHONE ENCOUNTER
I saw Whitney Patricia was in ER. If she needs a referral to gastroenterology for upper and lower scope, let me know and I am happy to place this. Thanks.

## 2022-05-26 NOTE — PROGRESS NOTES
Benoitcésar Mattsono [address] [place] [address]  Dept: [phone]  Loc: Yoni1 Yousif Real (:  1970) is a 50 y.o. female,Established patient, here for evaluation of the following chief complaint(s):  ED Follow-up and Abdominal Pain (aching, raditaing to back )      ASSESSMENT/PLAN:  1. Abnormal biliary HIDA scan  Pt non toxic appearing and in no acute distress. -     External Referral To Gastroenterology  2. Colon wall thickening  -     External Referral To Gastroenterology  3. Abnormal CT of the abdomen  Patient with hx of abnormal Hida Scan. Referred to GI. Has an appointment with Dr. Alli Zamora coming up. Is having a repeat Hida this afternoon, ordered by PCP.   -     External Referral To Gastroenterology       Return in about 1 week (around 2022), or if symptoms worsen or fail to improve. SUBJECTIVE/OBJECTIVE:  Having repeat HIDA today at noon. Hx of abnormal one 4 years ago     ER visit was for right lower abdominal pain, started about 2 months ago. has a past medical history of Anxiety, Biliary disease, Depression, Heartburn, and Migraine without aura and without status migrainosus, not intractable. Review of Systems   Constitutional: Negative for appetite change, chills, fatigue and fever. HENT: Negative for congestion, ear discharge, sinus pressure, tinnitus and voice change. Eyes: Negative for pain, discharge, itching and visual disturbance. Respiratory: Negative for cough, choking, chest tightness, shortness of breath and wheezing. Cardiovascular: Negative for chest pain, palpitations and leg swelling. Gastrointestinal: Positive for abdominal pain (radiates to back). Negative for abdominal distention, constipation, nausea and vomiting. Endocrine: Negative for cold intolerance and heat intolerance. Genitourinary: Negative for dysuria, hematuria, vaginal discharge and vaginal pain. Musculoskeletal: Negative for arthralgias, back pain, gait problem, neck pain and neck stiffness. Skin: Negative for color change and rash. Neurological: Negative for dizziness, syncope, speech difficulty, light-headedness, numbness and headaches. Psychiatric/Behavioral: Negative for behavioral problems, confusion, self-injury and suicidal ideas. The patient is not nervous/anxious. Physical Exam  Vitals and nursing note reviewed. Constitutional:       General: She is not in acute distress. Appearance: She is well-developed. She is not diaphoretic. HENT:      Head: Normocephalic and atraumatic. Right Ear: Tympanic membrane and external ear normal. Tympanic membrane is not injected or erythematous. Left Ear: Tympanic membrane and external ear normal. Tympanic membrane is not injected or erythematous. Nose: Nose normal.      Mouth/Throat:      Pharynx: Uvula midline. Eyes:      General:         Right eye: No discharge. Left eye: No discharge. Conjunctiva/sclera: Conjunctivae normal.      Pupils: Pupils are equal, round, and reactive to light. Neck:      Thyroid: No thyromegaly. Trachea: Trachea normal.   Cardiovascular:      Rate and Rhythm: Normal rate and regular rhythm. Pulses: Normal pulses. Heart sounds: Normal heart sounds, S1 normal and S2 normal. No murmur heard. No friction rub. No gallop. Pulmonary:      Effort: Pulmonary effort is normal. No respiratory distress. Breath sounds: Normal breath sounds. No wheezing or rales. Chest:      Chest wall: No tenderness. Abdominal:      General: Bowel sounds are normal. There is no distension. Palpations: Abdomen is soft. There is no mass. Tenderness: There is abdominal tenderness in the epigastric area. There is no guarding or rebound. Comments: Generalized nagging epigastric and RUQ pain. Musculoskeletal:         General: No tenderness or deformity.  Normal range of motion. Cervical back: Full passive range of motion without pain, normal range of motion and neck supple. Lymphadenopathy:      Cervical: No cervical adenopathy. Skin:     General: Skin is warm and dry. Capillary Refill: Capillary refill takes less than 2 seconds. Neurological:      Mental Status: She is alert and oriented to person, place, and time. Deep Tendon Reflexes: Reflexes are normal and symmetric. An electronic signature was used to authenticate this note.     --VANE Acosta - CNP

## 2022-06-01 LAB — NONINV COLON CA DNA+OCC BLD SCRN STL QL: POSITIVE

## 2022-06-06 DIAGNOSIS — R19.5 POSITIVE COLORECTAL CANCER SCREENING USING COLOGUARD TEST: Primary | ICD-10-CM

## 2022-06-08 ASSESSMENT — ENCOUNTER SYMPTOMS
EYE PAIN: 0
VOMITING: 0
ABDOMINAL DISTENTION: 0
NAUSEA: 0
COUGH: 0
EYE DISCHARGE: 0
COLOR CHANGE: 0
ABDOMINAL PAIN: 1
CHEST TIGHTNESS: 0
BACK PAIN: 0
CONSTIPATION: 0
SINUS PRESSURE: 0
VOICE CHANGE: 0
WHEEZING: 0
SHORTNESS OF BREATH: 0
EYE ITCHING: 0
CHOKING: 0

## 2022-06-09 ENCOUNTER — TELEPHONE (OUTPATIENT)
Dept: FAMILY MEDICINE CLINIC | Age: 52
End: 2022-06-09

## 2022-06-09 NOTE — TELEPHONE ENCOUNTER
Note from gastroenterology reviewed. Please call their office and make sure they are aware of positive FIT test results recently in. Forward these results. Thanks.

## 2022-06-10 NOTE — TELEPHONE ENCOUNTER
Spoke with marcus from Dr. Karol Quiñones office- message will be forwarder on to  Also results faxed to office at [phone]

## 2022-07-21 ENCOUNTER — OFFICE VISIT (OUTPATIENT)
Dept: FAMILY MEDICINE CLINIC | Age: 52
End: 2022-07-21
Payer: COMMERCIAL

## 2022-07-21 VITALS
SYSTOLIC BLOOD PRESSURE: 120 MMHG | OXYGEN SATURATION: 98 % | BODY MASS INDEX: 22.84 KG/M2 | RESPIRATION RATE: 16 BRPM | DIASTOLIC BLOOD PRESSURE: 70 MMHG | HEART RATE: 63 BPM | WEIGHT: 121 LBS | HEIGHT: 61 IN

## 2022-07-21 DIAGNOSIS — G44.209 TENSION HEADACHE: ICD-10-CM

## 2022-07-21 DIAGNOSIS — Z98.84 STATUS POST GASTRIC BYPASS FOR OBESITY: ICD-10-CM

## 2022-07-21 DIAGNOSIS — G43.009 MIGRAINE WITHOUT AURA AND WITHOUT STATUS MIGRAINOSUS, NOT INTRACTABLE: Primary | ICD-10-CM

## 2022-07-21 DIAGNOSIS — R12 HEARTBURN: ICD-10-CM

## 2022-07-21 DIAGNOSIS — Z23 NEED FOR SHINGLES VACCINE: ICD-10-CM

## 2022-07-21 DIAGNOSIS — R94.8 ABNORMAL BILIARY HIDA SCAN: ICD-10-CM

## 2022-07-21 DIAGNOSIS — G56.20 ULNAR NERVE ENTRAPMENT, UNSPECIFIED LATERALITY: ICD-10-CM

## 2022-07-21 PROBLEM — L70.5 ACNE EXCORIEE: Status: ACTIVE | Noted: 2018-09-19

## 2022-07-21 PROBLEM — K21.9 GASTRO-ESOPHAGEAL REFLUX DISEASE WITHOUT ESOPHAGITIS: Status: ACTIVE | Noted: 2022-07-21

## 2022-07-21 PROBLEM — D03.9 MELANOMA IN SITU (HCC): Status: ACTIVE | Noted: 2022-07-21

## 2022-07-21 PROBLEM — Z90.710 ACQUIRED ABSENCE OF BOTH CERVIX AND UTERUS: Status: ACTIVE | Noted: 2018-11-23

## 2022-07-21 PROBLEM — E55.9 VITAMIN D DEFICIENCY: Status: ACTIVE | Noted: 2018-02-23

## 2022-07-21 PROBLEM — L82.1 OTHER SEBORRHEIC KERATOSIS: Status: ACTIVE | Noted: 2018-09-19

## 2022-07-21 PROCEDURE — 1036F TOBACCO NON-USER: CPT | Performed by: FAMILY MEDICINE

## 2022-07-21 PROCEDURE — G8427 DOCREV CUR MEDS BY ELIG CLIN: HCPCS | Performed by: FAMILY MEDICINE

## 2022-07-21 PROCEDURE — 99214 OFFICE O/P EST MOD 30 MIN: CPT | Performed by: FAMILY MEDICINE

## 2022-07-21 PROCEDURE — G8420 CALC BMI NORM PARAMETERS: HCPCS | Performed by: FAMILY MEDICINE

## 2022-07-21 PROCEDURE — 90471 IMMUNIZATION ADMIN: CPT | Performed by: FAMILY MEDICINE

## 2022-07-21 PROCEDURE — 3017F COLORECTAL CA SCREEN DOC REV: CPT | Performed by: FAMILY MEDICINE

## 2022-07-21 PROCEDURE — 90750 HZV VACC RECOMBINANT IM: CPT | Performed by: FAMILY MEDICINE

## 2022-07-21 RX ORDER — ELECTROLYTES/DEXTROSE
SOLUTION, ORAL ORAL
COMMUNITY

## 2022-07-21 RX ORDER — SUMATRIPTAN 25 MG/1
25 TABLET, FILM COATED ORAL DAILY PRN
Qty: 18 TABLET | Refills: 2 | Status: SHIPPED | OUTPATIENT
Start: 2022-07-21

## 2022-07-21 RX ORDER — CYCLOBENZAPRINE HCL 5 MG
5-10 TABLET ORAL 2 TIMES DAILY PRN
Qty: 30 TABLET | Refills: 2 | Status: SHIPPED | OUTPATIENT
Start: 2022-07-21 | End: 2022-08-20

## 2022-07-21 ASSESSMENT — ENCOUNTER SYMPTOMS
COUGH: 0
DIARRHEA: 0
SHORTNESS OF BREATH: 0
ABDOMINAL PAIN: 0
EYE PAIN: 0
CONSTIPATION: 0
BACK PAIN: 1
WHEEZING: 0

## 2022-07-21 NOTE — PROGRESS NOTES
Attending attestation:  I personally performed and participated key or critical portions of the evaluation and management including personally performing the exam and medical decision making. I verify the accuracy of the documentation by the resident with the following addition or changes: Here for follow-up today. Needs refills. Migraines are stable on Imitrex. Getting tension headaches maybe 3-4 times a week. Took Tylenol and Aleve. Tried Excedrin which did help. 3-4 migraines in a month. Taking only half a dose. Interested in other options. Feels tired on a full dose. Also has tension in upper right back associated with headaches. Headaches are cyclical.  Will trial lower dose triptan and flexeril prn. Has not gotten blood work drawn. Will do this. Acne improved. Gallbladder was removed in June. Occasional heartburn. Taking Omeprazole. Will stop for 2 weeks and check h. Plyori stool. Taking multivitamin, B12, and iron. A bit of numbness in bilateral pinky fingers. Prior nerve study with minor ulnar nerve entrapment. Had mammogram that was normal; will get us records.         Electronically signed by Randi Centeno MD on 7/21/2022 at 9:22 AM

## 2022-07-21 NOTE — PROGRESS NOTES
[address] [address]  Dept: [phone]  Dept Fax: [phone]  Loc: [phone]      Og Olson is a 50 y.o. female who presents todayfor her medical conditions/complaints as noted below. Og Olson is c/o of Migraine (/) and Medication Refill      :     Bakari Alicea 51 y/o F with anxiety, s/p cholecystectomy in June 2022, and migraine with aura comes to the clinic to refill migraine medication. Has had migraines for majority of her life. Previously tired Rizatriptan(maxalt) over 20 year ago. But was stopped because didn't seem to be helping. Was started on another medication, and finally on imitrex which seemed to work the best.  Would like to discuss other options. States she only takes 1/2 an imitrex during the onset of an episode because a full dose \"knocks her out \"  States migraines are mostly to the right side, with majority of the time having light and smell sensitivity associated. Nasuea  She will have a dull pressure prior to migraine onset. Sometimes it stays as a headaches, other times it progresses to migraines. States sometimes there is a cluster of migraines for about a week at a time and then will not have them Also noticed when she has knots in her right shoulder blade she tends to have more migrains, and when she has that worked out the migraines do not come on     Imitrex was last filled in 2020. Previosuly has seen Dr. Natalie Coleman.      Patient Active Problem List   Diagnosis    Anxiety    Depression    Heartburn    Migraine without aura and without status migrainosus, not intractable    Biliary disease    Gastric bypass status for obesity    Other seborrheic keratosis    Gastro-esophageal reflux disease without esophagitis    Melanoma in situ ([facility] [address]    Status post bariatric surgery    Acne excoriee    Acquired absence of both cervix and uterus    Migraine headache Postsurgical dumping syndrome    Vitamin D deficiency      Goals    None       The patient is allergic to morphine. Medical History  Kendra has a past medical history of Anxiety, Biliary disease, Depression, Gastric bypass status for obesity, Heartburn, and Migraine without aura and without status migrainosus, not intractable. Past SurgicalHistory  The patient  has a past surgical history that includes Gastric bypass surgery; Hysterectomy, vaginal; Dilation and curettage of uterus; Abdominoplasty (12/2019); and Skin cancer excision. Family History  This patient's family history is not on file. Social History  Kendra  reports that she quit smoking about 3 years ago. Her smoking use included cigarettes. She has never used smokeless tobacco. She reports current alcohol use. She reports that she does not use drugs. Medications    Current Outpatient Medications:     Multiple Vitamin (MULTIVITAMIN ADULT) TABS, Take by mouth, Disp: , Rfl:     Cyanocobalamin (VITAMIN B-12) 5000 MCG LOZG, Take by mouth, Disp: , Rfl:     SUMAtriptan (IMITREX) 25 MG tablet, Take 1 tablet by mouth daily as needed for Migraine Take one. Can repeat in 2 hours if still with headache. Max 200 mg per 24 hours. , Disp: 18 tablet, Rfl: 2    cyclobenzaprine (FLEXERIL) 5 MG tablet, Take 1-2 tablets by mouth 2 times daily as needed for Muscle spasms, Disp: 30 tablet, Rfl: 2    ferrous sulfate (IRON 325) 325 (65 Fe) MG tablet, Take 1 tablet by mouth daily (with breakfast), Disp: 90 tablet, Rfl: 1    omeprazole (PRILOSEC) 20 MG delayed release capsule, Take 20 mg by mouth in the morning., Disp: , Rfl:     Subjective:      Review of Systems   Constitutional:  Negative for chills and fever. HENT:  Negative for ear pain. Eyes:  Negative for pain. Respiratory:  Negative for cough, shortness of breath and wheezing. Cardiovascular:  Negative for chest pain. Gastrointestinal:  Negative for abdominal pain, constipation and diarrhea. it out the migraines do not occur.   - SUMAtriptan (IMITREX) 25 MG tablet; Take 1 tablet by mouth daily as needed for Migraine Take one. Can repeat in 2 hours if still with headache. Max 200 mg per 24 hours. Dispense: 18 tablet; Refill: 2    2. Tension headache  A dull ache that sometimes can progress into a migraine, discussed prophylactic therapy, she not interested in anything at the moment because she does have extended periods of no headaches. Will revisit if headaches start to occur more often or if they stay around longer. 3. Heartburn  Stable on omeprazole. 4. Abnormal biliary HIDA scan  S/P cholecystectomy (self reported) in June 2022.    5. Status post gastric bypass for obesity  S/p bypass for obesity. 6. Ulnar nerve entrapment, unspecified laterality  States she has intermittent ulnar pain, not symptomatic currently. Works in sales. Will track for symptoms. 7. Need for shingles vaccine  - Zoster, SHINGRIX, (18 yrs +), IM      Return in about 1 month (around 8/21/2022) for re-check labs. .    Orders Placed   Orders Placed This Encounter   Procedures    Zoster, SHINGRIX, (18 yrs +), IM       Prescriptions given/sent  Orders Placed This Encounter   Medications    SUMAtriptan (IMITREX) 25 MG tablet     Sig: Take 1 tablet by mouth daily as needed for Migraine Take one. Can repeat in 2 hours if still with headache. Max 200 mg per 24 hours. Dispense:  18 tablet     Refill:  2    cyclobenzaprine (FLEXERIL) 5 MG tablet     Sig: Take 1-2 tablets by mouth 2 times daily as needed for Muscle spasms     Dispense:  30 tablet     Refill:  2       Patient given educational materials - see patient instructions. Discussed use, benefit, and side effects of recommended medications. All patient questions answered. Pt voiced understanding. Reviewed health maintenance.             Electronically signed by Sven Bay MD on 7/21/2022 at 12:57 PM

## 2022-08-08 ENCOUNTER — OFFICE VISIT (OUTPATIENT)
Dept: FAMILY MEDICINE CLINIC | Age: 52
End: 2022-08-08
Payer: COMMERCIAL

## 2022-08-08 VITALS
DIASTOLIC BLOOD PRESSURE: 72 MMHG | HEIGHT: 61 IN | SYSTOLIC BLOOD PRESSURE: 120 MMHG | HEART RATE: 80 BPM | WEIGHT: 124.8 LBS | BODY MASS INDEX: 23.56 KG/M2 | OXYGEN SATURATION: 97 %

## 2022-08-08 DIAGNOSIS — M25.511 RIGHT SHOULDER PAIN, UNSPECIFIED CHRONICITY: ICD-10-CM

## 2022-08-08 DIAGNOSIS — Z98.84 STATUS POST GASTRIC BYPASS FOR OBESITY: ICD-10-CM

## 2022-08-08 DIAGNOSIS — D50.8 OTHER IRON DEFICIENCY ANEMIA: ICD-10-CM

## 2022-08-08 DIAGNOSIS — K21.9 GASTRO-ESOPHAGEAL REFLUX DISEASE WITHOUT ESOPHAGITIS: ICD-10-CM

## 2022-08-08 DIAGNOSIS — D64.9 ANEMIA, UNSPECIFIED TYPE: Primary | ICD-10-CM

## 2022-08-08 DIAGNOSIS — G43.009 MIGRAINE WITHOUT AURA AND WITHOUT STATUS MIGRAINOSUS, NOT INTRACTABLE: ICD-10-CM

## 2022-08-08 LAB
BASOPHILS # BLD: 2.1 %
BASOPHILS ABSOLUTE: 0.1 THOU/MM3 (ref 0–0.1)
EOSINOPHIL # BLD: 1.5 %
EOSINOPHILS ABSOLUTE: 0.1 THOU/MM3 (ref 0–0.4)
ERYTHROCYTE [DISTWIDTH] IN BLOOD BY AUTOMATED COUNT: 16.3 % (ref 11.5–14.5)
ERYTHROCYTE [DISTWIDTH] IN BLOOD BY AUTOMATED COUNT: 50.4 FL (ref 35–45)
HCT VFR BLD CALC: 41 % (ref 37–47)
HEMOGLOBIN: 12.3 GM/DL (ref 12–16)
IMMATURE GRANS (ABS): 0.01 THOU/MM3 (ref 0–0.07)
IMMATURE GRANULOCYTES: 0.2 %
LYMPHOCYTES # BLD: 40.9 %
LYMPHOCYTES ABSOLUTE: 2.1 THOU/MM3 (ref 1–4.8)
MCH RBC QN AUTO: 25.2 PG (ref 26–33)
MCHC RBC AUTO-ENTMCNC: 30 GM/DL (ref 32.2–35.5)
MCV RBC AUTO: 83.8 FL (ref 81–99)
MONOCYTES # BLD: 8.3 %
MONOCYTES ABSOLUTE: 0.4 THOU/MM3 (ref 0.4–1.3)
NUCLEATED RED BLOOD CELLS: 0 /100 WBC
PLATELET # BLD: 343 THOU/MM3 (ref 130–400)
PMV BLD AUTO: 10.9 FL (ref 9.4–12.4)
RBC # BLD: 4.89 MILL/MM3 (ref 4.2–5.4)
SEG NEUTROPHILS: 47 %
SEGMENTED NEUTROPHILS ABSOLUTE COUNT: 2.4 THOU/MM3 (ref 1.8–7.7)
WBC # BLD: 5.2 THOU/MM3 (ref 4.8–10.8)

## 2022-08-08 PROCEDURE — G8420 CALC BMI NORM PARAMETERS: HCPCS | Performed by: FAMILY MEDICINE

## 2022-08-08 PROCEDURE — G8427 DOCREV CUR MEDS BY ELIG CLIN: HCPCS | Performed by: FAMILY MEDICINE

## 2022-08-08 PROCEDURE — 3017F COLORECTAL CA SCREEN DOC REV: CPT | Performed by: FAMILY MEDICINE

## 2022-08-08 PROCEDURE — 36415 COLL VENOUS BLD VENIPUNCTURE: CPT | Performed by: FAMILY MEDICINE

## 2022-08-08 PROCEDURE — 99214 OFFICE O/P EST MOD 30 MIN: CPT | Performed by: FAMILY MEDICINE

## 2022-08-08 PROCEDURE — 1036F TOBACCO NON-USER: CPT | Performed by: FAMILY MEDICINE

## 2022-08-08 SDOH — ECONOMIC STABILITY: FOOD INSECURITY: WITHIN THE PAST 12 MONTHS, YOU WORRIED THAT YOUR FOOD WOULD RUN OUT BEFORE YOU GOT MONEY TO BUY MORE.: NEVER TRUE

## 2022-08-08 SDOH — ECONOMIC STABILITY: FOOD INSECURITY: WITHIN THE PAST 12 MONTHS, THE FOOD YOU BOUGHT JUST DIDN'T LAST AND YOU DIDN'T HAVE MONEY TO GET MORE.: NEVER TRUE

## 2022-08-08 ASSESSMENT — ENCOUNTER SYMPTOMS
ABDOMINAL PAIN: 0
BACK PAIN: 1
DIARRHEA: 0
CONSTIPATION: 0
VOMITING: 0
NAUSEA: 0
SHORTNESS OF BREATH: 0

## 2022-08-08 ASSESSMENT — SOCIAL DETERMINANTS OF HEALTH (SDOH): HOW HARD IS IT FOR YOU TO PAY FOR THE VERY BASICS LIKE FOOD, HOUSING, MEDICAL CARE, AND HEATING?: NOT HARD AT ALL

## 2022-08-08 NOTE — PROGRESS NOTES
Shelby Smith (:  1970) is a 50 y.o. female,Established patient, here for evaluation of the following chief complaint(s):  Anemia         ASSESSMENT/PLAN:  1. Anemia, unspecified type  2. Migraine without aura and without status migrainosus, not intractable  3. Right shoulder pain, unspecified chronicity  4. Status post gastric bypass for obesity  -     Selenium Serum  -     Copper, Serum  -     Zinc  -     Transferrin  -     Ferritin  -     Iron Binding Capacity  -     Iron Saturation  -     Iron  -     CBC with Auto Differential  -     Basic Metabolic Panel  -     Vitamin K1  -     Vitamin E  -     Vitamin D 25 Hydroxy  -     Vitamin A  -     Vitamin B1  -     Vitamin B12 & Folate  5. Other iron deficiency anemia  -     Transferrin  -     Ferritin  -     Iron Binding Capacity  -     Iron Saturation  -     Iron  6. Gastro-esophageal reflux disease without esophagitis    Anemia stable. Patient to get labs from 5/10/22 drawn today. Continue ferrous sulfate 325mg daily. Migraines, stable, controlled. Pt reports relief with Imitrex and ibuprofen. Continue current reigmen. Right shoulder pain, stable. Pt alternating between flexeril and ibuprofen for pain. Using tennis ball against a wall to work out some of the knots. Feeling relief from this. Continue current medication regimen. Post gastric bypass, stable. Pt to get labs drawn today to make sure everything is within range. GERD stable, controlled. Continue omeprazole 40mg prn. Last visit with GI was normal, pt had upper scope that revealed no organisms. No need for H. Pylori test.    Advised pt to try to get us mammogram records. Return in about 6 months (around 2023) for re-check migraines  . Subjective   SUBJECTIVE/OBJECTIVE:  GEORGE    Zayra Page is a 51 y/o female here for a follow up for low iron levels. Her hemoglobin on 2022 was 9.7. She states she started taking ferrous sulfate 325mg per day and a multivitamin. States that the flexeril is helping her right shoulder blade pain that is tied to her headaches. She has been using a tennis ball to help massage that spot. Worse with inspiration. She states she is taking Imitrex 25mg prn for migraines. States it is helping. She crochets for 2-3 hours a day, which she feels like could be contributing to the shoulder pain. She had a cholecystectomy May 2022. States she is not having abdominal pain anymore. Review of Systems   Constitutional:  Negative for activity change, appetite change, chills and fever. HENT: Negative. Respiratory:  Negative for shortness of breath. Cardiovascular:  Negative for chest pain and palpitations. Gastrointestinal:  Negative for abdominal pain, constipation, diarrhea, nausea and vomiting. Genitourinary:  Negative for difficulty urinating and dysuria. Musculoskeletal:  Positive for back pain. Right shoulder pain, tension type pain   Skin:  Negative for rash. Neurological:  Positive for headaches. Negative for dizziness and numbness. Psychiatric/Behavioral: Negative. Objective   Physical Exam  Constitutional:       Appearance: Normal appearance. HENT:      Head: Normocephalic and atraumatic. Mouth/Throat:      Mouth: Mucous membranes are moist.      Pharynx: Oropharynx is clear. Eyes:      Extraocular Movements: Extraocular movements intact. Pupils: Pupils are equal, round, and reactive to light. Cardiovascular:      Rate and Rhythm: Normal rate and regular rhythm. Pulses: Normal pulses. Heart sounds: Normal heart sounds. Pulmonary:      Effort: Pulmonary effort is normal.      Breath sounds: Normal breath sounds. Abdominal:      General: Abdomen is flat. Bowel sounds are normal.   Musculoskeletal:         General: Normal range of motion. Cervical back: Normal range of motion and neck supple. Skin:     General: Skin is warm and dry.    Neurological:      Mental Status: She is alert and oriented to person, place, and time. Psychiatric:         Mood and Affect: Mood normal.         Behavior: Behavior normal.                An electronic signature was used to authenticate this note.     --Merna Mcmanus DO

## 2022-08-08 NOTE — PROGRESS NOTES
Attending attestation:  I personally performed and participated key or critical portions of the evaluation and management including personally performing the exam and medical decision making. I verify the accuracy of the documentation by the resident with the following addition or changes: Here for follow-up today. Most labs were not drawn. Is taking ferrous sulfate daily. Had colonoscopy but due for 7 year repeat for polyps. Still taking flexeril (not using as often) and ibuprofen prn. Will continue this. Continue triptan prn for migraines (has only used a couple times; headaches are better). Using tennis ball against the wall for muscle spasms. Intermittent numbness of left hand is better also. Is crocheting. We still need records of mammogram.  Takes omeprazole prn. Had upper scope that was without organisms so no need for h. Pylori. Will check labs. Continue current regimen. Re-check in 4-6 months. Encouraged getting us mammogram records and getting COVID-19 booster.             Electronically signed by Michael Wray MD on 8/8/2022 at 2:06 PM

## 2022-08-09 ENCOUNTER — TELEPHONE (OUTPATIENT)
Dept: FAMILY MEDICINE CLINIC | Age: 52
End: 2022-08-09

## 2022-08-09 LAB
ANION GAP SERPL CALCULATED.3IONS-SCNC: 12 MEQ/L (ref 8–16)
BUN BLDV-MCNC: 17 MG/DL (ref 7–22)
CALCIUM SERPL-MCNC: 9.2 MG/DL (ref 8.5–10.5)
CHLORIDE BLD-SCNC: 104 MEQ/L (ref 98–111)
CO2: 23 MEQ/L (ref 23–33)
CREAT SERPL-MCNC: 0.6 MG/DL (ref 0.4–1.2)
FERRITIN: 14 NG/ML (ref 10–291)
FOLATE: 6.7 NG/ML (ref 4.8–24.2)
GFR SERPL CREATININE-BSD FRML MDRD: > 90 ML/MIN/1.73M2
GLUCOSE BLD-MCNC: 80 MG/DL (ref 70–108)
IRON SATURATION: 12 % (ref 20–50)
IRON: 56 UG/DL (ref 50–170)
POTASSIUM SERPL-SCNC: 4.6 MEQ/L (ref 3.5–5.2)
SODIUM BLD-SCNC: 139 MEQ/L (ref 135–145)
TOTAL IRON BINDING CAPACITY: 452 UG/DL (ref 171–450)
TRANSFERRIN: 405 MG/DL (ref 200–360)
VITAMIN B-12: 1968 PG/ML (ref 211–911)
VITAMIN D 25-HYDROXY: 22 NG/ML (ref 30–100)

## 2022-08-09 NOTE — TELEPHONE ENCOUNTER
Spoke with pt. She needs Zinc,Copper and selenium redrawn. Must be spun down red tops and poured into blue arup top tube.

## 2022-08-11 ENCOUNTER — OFFICE VISIT (OUTPATIENT)
Dept: FAMILY MEDICINE CLINIC | Age: 52
End: 2022-08-11
Payer: COMMERCIAL

## 2022-08-11 VITALS
HEART RATE: 84 BPM | WEIGHT: 123 LBS | DIASTOLIC BLOOD PRESSURE: 72 MMHG | TEMPERATURE: 98.2 F | SYSTOLIC BLOOD PRESSURE: 118 MMHG | BODY MASS INDEX: 23.24 KG/M2

## 2022-08-11 DIAGNOSIS — R80.9 HEMATURIA WITH PROTEINURIA: ICD-10-CM

## 2022-08-11 DIAGNOSIS — Z87.891 PERSONAL HISTORY OF TOBACCO USE: ICD-10-CM

## 2022-08-11 DIAGNOSIS — D50.8 OTHER IRON DEFICIENCY ANEMIA: ICD-10-CM

## 2022-08-11 DIAGNOSIS — N30.01 ACUTE CYSTITIS WITH HEMATURIA: Primary | ICD-10-CM

## 2022-08-11 DIAGNOSIS — R31.9 HEMATURIA WITH PROTEINURIA: ICD-10-CM

## 2022-08-11 DIAGNOSIS — E55.9 VITAMIN D DEFICIENCY: ICD-10-CM

## 2022-08-11 PROBLEM — D64.9 ABSOLUTE ANEMIA: Status: ACTIVE | Noted: 2022-08-11

## 2022-08-11 LAB
BILIRUBIN, POC: NEGATIVE
BLOOD URINE, POC: NORMAL
CLARITY, POC: CLEAR
COLOR, POC: YELLOW
GLUCOSE URINE, POC: NEGATIVE
KETONES, POC: NEGATIVE
LEUKOCYTE EST, POC: NEGATIVE
NITRITE, POC: NORMAL
PH, POC: 6.5
PROTEIN, POC: 30
SPECIFIC GRAVITY, POC: 1.01
UROBILINOGEN, POC: 0.2
VITAMIN K1: NORMAL

## 2022-08-11 PROCEDURE — G0296 VISIT TO DETERM LDCT ELIG: HCPCS | Performed by: FAMILY MEDICINE

## 2022-08-11 PROCEDURE — 99214 OFFICE O/P EST MOD 30 MIN: CPT | Performed by: FAMILY MEDICINE

## 2022-08-11 PROCEDURE — 3017F COLORECTAL CA SCREEN DOC REV: CPT | Performed by: FAMILY MEDICINE

## 2022-08-11 PROCEDURE — G8427 DOCREV CUR MEDS BY ELIG CLIN: HCPCS | Performed by: FAMILY MEDICINE

## 2022-08-11 PROCEDURE — 1036F TOBACCO NON-USER: CPT | Performed by: FAMILY MEDICINE

## 2022-08-11 PROCEDURE — G8420 CALC BMI NORM PARAMETERS: HCPCS | Performed by: FAMILY MEDICINE

## 2022-08-11 PROCEDURE — 81003 URINALYSIS AUTO W/O SCOPE: CPT | Performed by: FAMILY MEDICINE

## 2022-08-11 RX ORDER — NITROFURANTOIN 25; 75 MG/1; MG/1
100 CAPSULE ORAL 2 TIMES DAILY
Qty: 10 CAPSULE | Refills: 0 | Status: SHIPPED | OUTPATIENT
Start: 2022-08-11 | End: 2022-08-16

## 2022-08-11 RX ORDER — ERGOCALCIFEROL 1.25 MG/1
50000 CAPSULE ORAL WEEKLY
Qty: 12 CAPSULE | Refills: 1 | Status: SHIPPED | OUTPATIENT
Start: 2022-08-11

## 2022-08-11 RX ORDER — FERROUS SULFATE 325(65) MG
325 TABLET ORAL
Qty: 270 TABLET | Refills: 1 | Status: SHIPPED | OUTPATIENT
Start: 2022-08-11

## 2022-08-11 ASSESSMENT — ENCOUNTER SYMPTOMS
RESPIRATORY NEGATIVE: 1
EYES NEGATIVE: 1
GASTROINTESTINAL NEGATIVE: 1

## 2022-08-11 NOTE — PROGRESS NOTES
Attending attestation:  I personally performed and participated key or critical portions of the evaluation and management including personally performing the exam and medical decision making. I verify the accuracy of the documentation by the resident with the following addition or changes: Acute UTI symptoms since last night. One other UTI in the past.  UA consistent. Vitals and exam consistent. Will treat empirically with nitrofurantoin and send culture. Has stopped smoking since May. Congratulated. Will check CT lung screen. Also reviewed labs. Anemia is improved but still iron deficient. Can increase to TID with meals if tolerated. Also low vitamin D. Will add supplement. Other labs still pending. Has had mammogram and will get us results.         Electronically signed by Priscilla Nunez MD on 8/11/2022 at 12:40 PM

## 2022-08-11 NOTE — PATIENT INSTRUCTIONS
Can take AZO if you have burning. 2/6/23 at 1:30pm is your next appt w/ Dr. Elías Del Rio    What is lung cancer screening? Lung cancer screening is a way in which doctors check the lungs for early signs of cancer in people who have no symptoms of lung cancer. A low-dose CT scan uses much less radiation than a normal CT scan and shows a more detailed image of the lungs than a standard X-ray. The goal of lung cancer screening is to find cancer early, before it has a chance to grow, spread, or cause problems. One large study found that smokers who were screened with low-dose CT scans were less likely to die of lung cancer than those who were screened with standard X-ray. Below is a summary of the things you need to know regarding screening for lung cancer with low-dose computed tomography (LDCT). This is a screening program that involves routine annual screening with LDCT studies of the lung. The LDCTs are done using low-dose radiation that is not thought to increase your cancer risk. If you have other serious medical conditions (other cancers, congestive heart failure) that limit your life expectancy to less than 10 years, you should not undergo lung cancer screening with LDCT. The chance is 20%-60% that the LDCT result will show abnormalities. This would require additional testing which could include repeat imaging or even invasive procedures. Most (about 95%) of \"abnormal\" LDCT results are false in the sense that no lung cancer is ultimately found. Additionally, some (about 10%) of the cancers found would not affect your life expectancy, even if undetected and untreated. If you are still smoking, the single most important thing that you can do to reduce your risk of dying of lung cancer is to quit. For this screening to be covered by Medicare and most other insurers, strict criteria must be met.   If you do not meet these criteria, but still wish to undergo LDCT testing, you will be required to sign a waiver indicating your willingness to pay for the scan.

## 2022-08-11 NOTE — PROGRESS NOTES
[address] [address]  Dept: [phone]  Dept Fax: [phone]  Loc: [phone]      Sunshine Humphrey is a 50 y.o. female who presents todayfor Dysuria      :   HPI    Started last night. Dysuria. Also some blood in urine. A bit of urgency. Frequency. Has suprapubic tenderness. Last uti was years ago, no history of recurrent UTIs. No vaginal discharge. No fevers. No flank pain. No n/v. Quit smoking on May 18th of 2022. Last mammogram 3 months ago, no issues. Will need to give office the results. She reports it was normal.        patient is allergic to morphine. Past MedicalHistory  Peewee Mahmood  has a past medical history of Anxiety, Biliary disease, Depression, Gastric bypass status for obesity, Heartburn, and Migraine without aura and without status migrainosus, not intractable. Past Surgical History  The patient  has a past surgical history that includes Gastric bypass surgery; Hysterectomy, vaginal; Dilation and curettage of uterus; Abdominoplasty (12/2019); and Skin cancer excision. Family History  This patient's family history is not on file. Social History  Kendra  reports that she quit smoking about 2 months ago. Her smoking use included cigarettes. She has a 22.50 pack-year smoking history. She has been exposed to tobacco smoke. She has never used smokeless tobacco. She reports current alcohol use. She reports that she does not use drugs. Medications    Current Outpatient Medications:     nitrofurantoin, macrocrystal-monohydrate, (MACROBID) 100 MG capsule, Take 1 capsule by mouth in the morning and 1 capsule before bedtime. Do all this for 5 days. , Disp: 10 capsule, Rfl: 0    vitamin D (ERGOCALCIFEROL) 1.25 MG (18210 UT) CAPS capsule, Take 1 capsule by mouth once a week, Disp: 12 capsule, Rfl: 1    ferrous sulfate (IRON 325) 325 (65 Fe) MG tablet, Take 1 tablet by mouth in the morning and 1 tablet at noon and 1 tablet in the evening. Take with meals. , Disp: 270 tablet, Rfl: 1    Multiple Vitamin (MULTIVITAMIN ADULT) TABS, Take by mouth, Disp: , Rfl:     Cyanocobalamin (VITAMIN B-12) 5000 MCG LOZG, Take by mouth, Disp: , Rfl:     SUMAtriptan (IMITREX) 25 MG tablet, Take 1 tablet by mouth daily as needed for Migraine Take one. Can repeat in 2 hours if still with headache. Max 200 mg per 24 hours. , Disp: 18 tablet, Rfl: 2    cyclobenzaprine (FLEXERIL) 5 MG tablet, Take 1-2 tablets by mouth 2 times daily as needed for Muscle spasms, Disp: 30 tablet, Rfl: 2    omeprazole (PRILOSEC) 20 MG delayed release capsule, Take 20 mg by mouth in the morning., Disp: , Rfl:     :     Review of Systems   Constitutional: Negative. HENT: Negative. Eyes: Negative. Respiratory: Negative. Cardiovascular: Negative. Gastrointestinal: Negative. Genitourinary:  Positive for dysuria, frequency, hematuria and urgency. Negative for flank pain and vaginal discharge.     :     Vitals:    08/11/22 1255   BP: 118/72   Pulse: 84   Temp: 98.2 °F (36.8 °C)   Weight: 123 lb (55.8 kg)       Physical Exam  Vitals reviewed. Constitutional:       Appearance: Normal appearance. HENT:      Head: Normocephalic and atraumatic. Eyes:      General: No scleral icterus. Conjunctiva/sclera: Conjunctivae normal.   Cardiovascular:      Rate and Rhythm: Normal rate and regular rhythm. Pulses: Normal pulses. Heart sounds: Normal heart sounds. Pulmonary:      Effort: No respiratory distress. Breath sounds: Normal breath sounds. Chest:      Chest wall: No tenderness. Abdominal:      General: Abdomen is flat. Bowel sounds are normal. There is no distension. Palpations: Abdomen is soft. Tenderness: There is no abdominal tenderness. Comments: Supapubic discomfort   Musculoskeletal:      Right lower leg: No edema. Left lower leg: No edema. Skin:     General: Skin is warm and dry. Capillary Refill: Capillary refill takes less than 2 seconds. Coloration: Skin is not jaundiced. Neurological:      General: No focal deficit present. Mental Status: She is alert and oriented to person, place, and time. Psychiatric:         Mood and Affect: Mood normal.         Behavior: Behavior normal.       Assessment/Plan:   1. Personal history of tobacco use  - HI VISIT TO DISCUSS LUNG CA SCREEN W LDCT  - CT Lung Screen (Annual); Future  - Culture, Urine    2. Acute cystitis with hematuria  - POCT Urinalysis No Micro (Auto)  - Culture, Urine  - macrobid    3. Anemia  - continue iron supplementation, can take up to 3 times / day given hx of gastric bypass. Patient currently has no constipation. 4. Vit D deficency - continue vit d    5. Hematuria w/ proteinuria - seen on U/A today, likely 2/2 UTI, but will repeat in future to ensure it's gone    Return in about 6 months (around 2023) for at 1:30 For already scheduled appt. Orders Placed  Orders Placed This Encounter   Procedures    Culture, Urine     Order Specific Question:   Specify (ex-cath, midstream, cysto, etc)? Answer:   mid-stream    CT Lung Screen (Annual)     Age: Patient is 50 y.o. Smoking History: Social History    Tobacco Use      Smoking status: Former        Packs/day: 0.75        Years: 30.00        Pack years: 22.5        Types: Cigarettes        Quit date: 2022        Years since quittin.2        Passive exposure: Current      Smokeless tobacco: Never    Alcohol use: Yes      Comment: social    Drug use: Never   Pack years: 22.5    Date of last lung cancer screening: No previous lung cancer screening exam     Standing Status:   Future     Standing Expiration Date:   2024     Order Specific Question:   Is there documentation of shared decision making? Answer:   Yes     Order Specific Question:   Is this a low dose CT or a routine CT?      Answer:   Low Dose CT [1]     Order Specific Question:   Is annual background radiation exposure from everyday life. Starting screening at age 54 is not likely to increase cancer risk from radiation exposure. Patients with comorbidities resulting in life expectancy of < 10 years, or that would preclude treatment of an abnormality identified on CT, should not be screened due to lack of benefit.     To obtain maximal benefit from this screening, smoking cessation and long-term abstinence from smoking is critical

## 2022-08-12 LAB
ORGANISM: ABNORMAL
RETINOL (VITAMIN A): NORMAL
URINE CULTURE, ROUTINE: ABNORMAL
VITAMIN E LEVEL: NORMAL

## 2022-08-13 LAB — VITAMIN B1 WHOLE BLOOD: 126 NMOL/L (ref 70–180)

## 2022-08-17 DIAGNOSIS — R31.9 HEMATURIA, UNSPECIFIED TYPE: Primary | ICD-10-CM

## 2022-08-26 ENCOUNTER — HOSPITAL ENCOUNTER (OUTPATIENT)
Dept: CT IMAGING | Age: 52
Discharge: HOME | End: 2022-08-26
Payer: COMMERCIAL

## 2022-08-26 DIAGNOSIS — Z87.891 PERSONAL HISTORY OF TOBACCO USE: ICD-10-CM

## 2022-08-26 PROCEDURE — 71271 CT THORAX LUNG CANCER SCR C-: CPT

## 2022-08-28 LAB
COPPER: 119.1 UG/DL (ref 80–155)
SELENIUM: 116.1 UG/L (ref 23–190)
ZINC: 48.2 UG/DL (ref 60–120)

## 2022-10-11 ENCOUNTER — TELEPHONE (OUTPATIENT)
Dept: FAMILY MEDICINE CLINIC | Age: 52
End: 2022-10-11

## 2022-12-14 ENCOUNTER — OFFICE VISIT (OUTPATIENT)
Dept: FAMILY MEDICINE CLINIC | Age: 52
End: 2022-12-14
Payer: COMMERCIAL

## 2022-12-14 VITALS
SYSTOLIC BLOOD PRESSURE: 116 MMHG | RESPIRATION RATE: 16 BRPM | BODY MASS INDEX: 24.13 KG/M2 | TEMPERATURE: 97.9 F | HEART RATE: 87 BPM | OXYGEN SATURATION: 96 % | WEIGHT: 127.8 LBS | HEIGHT: 61 IN | DIASTOLIC BLOOD PRESSURE: 76 MMHG

## 2022-12-14 DIAGNOSIS — M62.838 MUSCLE SPASM: ICD-10-CM

## 2022-12-14 DIAGNOSIS — U07.1 COVID-19 VIRUS INFECTION: Primary | ICD-10-CM

## 2022-12-14 DIAGNOSIS — G43.009 MIGRAINE WITHOUT AURA AND WITHOUT STATUS MIGRAINOSUS, NOT INTRACTABLE: ICD-10-CM

## 2022-12-14 LAB
Lab: ABNORMAL
QC PASS/FAIL: ABNORMAL
SARS-COV-2 RDRP RESP QL NAA+PROBE: POSITIVE

## 2022-12-14 PROCEDURE — G8484 FLU IMMUNIZE NO ADMIN: HCPCS | Performed by: STUDENT IN AN ORGANIZED HEALTH CARE EDUCATION/TRAINING PROGRAM

## 2022-12-14 PROCEDURE — G8427 DOCREV CUR MEDS BY ELIG CLIN: HCPCS | Performed by: STUDENT IN AN ORGANIZED HEALTH CARE EDUCATION/TRAINING PROGRAM

## 2022-12-14 PROCEDURE — 1036F TOBACCO NON-USER: CPT | Performed by: STUDENT IN AN ORGANIZED HEALTH CARE EDUCATION/TRAINING PROGRAM

## 2022-12-14 PROCEDURE — 87635 SARS-COV-2 COVID-19 AMP PRB: CPT | Performed by: STUDENT IN AN ORGANIZED HEALTH CARE EDUCATION/TRAINING PROGRAM

## 2022-12-14 PROCEDURE — 99213 OFFICE O/P EST LOW 20 MIN: CPT | Performed by: STUDENT IN AN ORGANIZED HEALTH CARE EDUCATION/TRAINING PROGRAM

## 2022-12-14 PROCEDURE — 3017F COLORECTAL CA SCREEN DOC REV: CPT | Performed by: STUDENT IN AN ORGANIZED HEALTH CARE EDUCATION/TRAINING PROGRAM

## 2022-12-14 PROCEDURE — G8420 CALC BMI NORM PARAMETERS: HCPCS | Performed by: STUDENT IN AN ORGANIZED HEALTH CARE EDUCATION/TRAINING PROGRAM

## 2022-12-14 RX ORDER — SUMATRIPTAN 25 MG/1
25 TABLET, FILM COATED ORAL DAILY PRN
Qty: 18 TABLET | Refills: 0 | Status: SHIPPED | OUTPATIENT
Start: 2022-12-14

## 2022-12-14 RX ORDER — CYCLOBENZAPRINE HCL 5 MG
5 TABLET ORAL PRN
COMMUNITY
Start: 2022-12-05 | End: 2022-12-14 | Stop reason: SDUPTHER

## 2022-12-14 RX ORDER — CYCLOBENZAPRINE HCL 5 MG
5 TABLET ORAL DAILY PRN
Qty: 30 TABLET | Refills: 0 | Status: SHIPPED | OUTPATIENT
Start: 2022-12-14 | End: 2023-01-13

## 2022-12-14 NOTE — LETTER
[address] [facility] [address]  Phone: [phone]  Fax: [address] [facility], DO        December 14, 2022     Patient: Loree Knox   YOB: 1970   Date of Visit: 12/14/2022       To Whom It May Concern: It is my medical opinion that Eliazar Celestin has COVID-19 infection and may return to work on 12/19/2022. If you have any questions or concerns, please don't hesitate to call.     Sincerely,        Jackie Brown, DO

## 2022-12-14 NOTE — PROGRESS NOTES
[address] [address]  Dept: [phone]  Dept Fax: [phone]  Loc: [phone]    Zunilda Bear is a 50 y.o. female who presents today for her medical conditions/complaints as noted below. Chief Complaint   Patient presents with    Other     Patient has been experiencing cough, headache, sinus drainage since 12/12/2022. Patient states that she took a covid home test on 12/13 and it was positive    Medication Refill       HPI:     Patient presents to the office today for URI symptoms and a positive home COVID test.  Patient states that she has had a sore throat, nasal drainage, nasal congestion, ear pain, and cough x2 days. She has also noticed some headaches and chills as well. Denies any associated fever, nausea, vomiting, or diarrhea. She has been taking Mucinex cold and flu, Tylenol with some relief. Patient took a home COVID test on 12/13/2022, which was positive. No one else is sick at home currently. She does need a work excuse. Patient is not a current smoker but reports that she quit smoking several months ago. She does have a 30 pack year smoking history. Patient also reports that she needs a refill of her Imitrex and Flexeril which she takes as needed when she gets migraines. Migraines have been stable, per patient. Past Medical History:   Diagnosis Date    Anxiety     Biliary disease     Depression     Gastric bypass status for obesity     Heartburn     Migraine without aura and without status migrainosus, not intractable       Past Surgical History:   Procedure Laterality Date    ABDOMINOPLASTY  12/2019    DILATION AND CURETTAGE OF UTERUS      GASTRIC BYPASS SURGERY      HYSTERECTOMY, VAGINAL      SKIN CANCER EXCISION      melanoma en situ       History reviewed. No pertinent family history.     Social History     Tobacco Use    Smoking status: Former     Packs/day: 0.75     Years: 30.00     Pack years: 22.50     Types: Cigarettes     Quit date: 2022     Years since quittin.5     Passive exposure: Current    Smokeless tobacco: Never   Substance Use Topics    Alcohol use: Yes     Comment: social      Current Outpatient Medications   Medication Sig Dispense Refill    cyclobenzaprine (FLEXERIL) 5 MG tablet Take 1 tablet by mouth daily as needed for Muscle spasms 30 tablet 0    SUMAtriptan (IMITREX) 25 MG tablet Take 1 tablet by mouth daily as needed for Migraine Take one. Can repeat in 2 hours if still with headache. Max 200 mg per 24 hours. 18 tablet 0    nirmatrelvir/ritonavir (PAXLOVID) 20 x 150 MG & 10 x 100MG TBPK Take 3 tablets (two 150 mg nirmatrelvir and one 100 mg ritonavir tablets) by mouth every 12 hours for 5 days. 30 tablet 0    Zinc 20 MG CAPS Take 20 mg by mouth daily 90 capsule 3    vitamin D (ERGOCALCIFEROL) 1.25 MG (30082 UT) CAPS capsule Take 1 capsule by mouth once a week 12 capsule 1    ferrous sulfate (IRON 325) 325 (65 Fe) MG tablet Take 1 tablet by mouth in the morning and 1 tablet at noon and 1 tablet in the evening. Take with meals. 270 tablet 1    Multiple Vitamin (MULTIVITAMIN ADULT) TABS Take by mouth      Cyanocobalamin (VITAMIN B-12) 5000 MCG LOZG Take by mouth      omeprazole (PRILOSEC) 20 MG delayed release capsule Take 20 mg by mouth in the morning. No current facility-administered medications for this visit.      Allergies   Allergen Reactions    Morphine Hives       Health Maintenance   Topic Date Due    COVID-19 Vaccine (3 - Booster for Moderna series) 2021    Breast cancer screen  Never done    Shingles vaccine (2 of 2) 09/15/2022    Depression Monitoring  2023    Low dose CT lung screening  2023    Lipids  2027    Colorectal Cancer Screen  2029    DTaP/Tdap/Td vaccine (2 - Td or Tdap) 05/10/2032    Flu vaccine  Completed    Hepatitis C screen  Completed    HIV screen  Completed    Hepatitis A vaccine  Aged Out Hib vaccine  Aged Out    Meningococcal (ACWY) vaccine  Aged Out    Pneumococcal 0-64 years Vaccine  Aged Out       Subjective:      Review of Systems   Constitutional:  Positive for chills. Negative for appetite change and fever. HENT:  Positive for congestion, ear pain, rhinorrhea and sore throat. Negative for ear discharge. Respiratory:  Positive for cough. Negative for shortness of breath and wheezing. Cardiovascular:  Negative for chest pain. Gastrointestinal:  Negative for abdominal pain, diarrhea, nausea and vomiting. Neurological:  Positive for headaches. Objective:     Physical Exam  Vitals and nursing note reviewed. Constitutional:       General: She is not in acute distress. Appearance: Normal appearance. She is well-developed and normal weight. She is not toxic-appearing. HENT:      Head: Normocephalic and atraumatic. Right Ear: Tympanic membrane, ear canal and external ear normal.      Left Ear: Tympanic membrane, ear canal and external ear normal.      Nose: Congestion present. Mouth/Throat:      Lips: Pink. Mouth: Mucous membranes are moist.      Pharynx: Oropharynx is clear. Uvula midline. No oropharyngeal exudate or posterior oropharyngeal erythema. Eyes:      General: Lids are normal.      Conjunctiva/sclera: Conjunctivae normal.      Pupils: Pupils are equal, round, and reactive to light. Cardiovascular:      Rate and Rhythm: Normal rate and regular rhythm. Heart sounds: Normal heart sounds. No murmur heard. Pulmonary:      Effort: Pulmonary effort is normal.      Breath sounds: Normal breath sounds and air entry. No wheezing, rhonchi or rales. Musculoskeletal:      Cervical back: Neck supple. Lymphadenopathy:      Cervical: No cervical adenopathy. Skin:     General: Skin is warm and dry. Neurological:      General: No focal deficit present. Mental Status: She is alert and oriented to person, place, and time. Gait: Gait is intact. Psychiatric:         Mood and Affect: Mood and affect normal.         Behavior: Behavior is cooperative. /76 (Site: Right Upper Arm, Position: Sitting, Cuff Size: Medium Adult)   Pulse 87   Temp 97.9 °F (36.6 °C) (Oral)   Resp 16   Ht 5' 1\" (1.549 m)   Wt 127 lb 12.8 oz (58 kg)   SpO2 96% Comment: Room Air  BMI 24.15 kg/m²     Assessment/Plan:   Kendra was seen today for other and medication refill. Diagnoses and all orders for this visit:    COVID-19 virus infection  -     nirmatrelvir/ritonavir (PAXLOVID) 20 x 150 MG & 10 x 100MG TBPK; Take 3 tablets (two 150 mg nirmatrelvir and one 100 mg ritonavir tablets) by mouth every 12 hours for 5 days. Migraine without aura and without status migrainosus, not intractable  -     SUMAtriptan (IMITREX) 25 MG tablet; Take 1 tablet by mouth daily as needed for Migraine Take one. Can repeat in 2 hours if still with headache. Max 200 mg per 24 hours. Muscle spasm  -     cyclobenzaprine (FLEXERIL) 5 MG tablet; Take 1 tablet by mouth daily as needed for Muscle spasms    72-year-old healthy female former smoker presenting to the office with URI symptoms and positive home COVID test.  Patient is afebrile and nontoxic-appearing in the office today. Vitals are appropriate. Rapid COVID is positive. Patient does qualify for antiviral therapy due to history of tobacco use. I will plan to start patient on Paxlovid x 5 days. Recommended continued supportive care with rest, hydration, Tylenol or Motrin as needed for fever or discomfort. Advised to monitor symptoms closely and return if worsening or persisting. Work excuse provided for patient to return on 12/19/2022. Medication refills ordered as detailed above. Return if symptoms worsen or fail to improve.     Electronically signed by Suze Aragon DO on 12/15/2022 at 7:36 AM

## 2022-12-15 ASSESSMENT — ENCOUNTER SYMPTOMS
NAUSEA: 0
DIARRHEA: 0
SORE THROAT: 1
COUGH: 1
RHINORRHEA: 1
ABDOMINAL PAIN: 0
SHORTNESS OF BREATH: 0
VOMITING: 0
WHEEZING: 0

## 2023-02-03 SDOH — ECONOMIC STABILITY: TRANSPORTATION INSECURITY
IN THE PAST 12 MONTHS, HAS LACK OF TRANSPORTATION KEPT YOU FROM MEETINGS, WORK, OR FROM GETTING THINGS NEEDED FOR DAILY LIVING?: NO

## 2023-02-03 SDOH — ECONOMIC STABILITY: FOOD INSECURITY: WITHIN THE PAST 12 MONTHS, THE FOOD YOU BOUGHT JUST DIDN'T LAST AND YOU DIDN'T HAVE MONEY TO GET MORE.: NEVER TRUE

## 2023-02-03 SDOH — ECONOMIC STABILITY: HOUSING INSECURITY
IN THE LAST 12 MONTHS, WAS THERE A TIME WHEN YOU DID NOT HAVE A STEADY PLACE TO SLEEP OR SLEPT IN A SHELTER (INCLUDING NOW)?: NO

## 2023-02-03 SDOH — ECONOMIC STABILITY: INCOME INSECURITY: HOW HARD IS IT FOR YOU TO PAY FOR THE VERY BASICS LIKE FOOD, HOUSING, MEDICAL CARE, AND HEATING?: NOT VERY HARD

## 2023-02-03 SDOH — ECONOMIC STABILITY: FOOD INSECURITY: WITHIN THE PAST 12 MONTHS, YOU WORRIED THAT YOUR FOOD WOULD RUN OUT BEFORE YOU GOT MONEY TO BUY MORE.: NEVER TRUE

## 2023-02-06 ENCOUNTER — OFFICE VISIT (OUTPATIENT)
Dept: FAMILY MEDICINE CLINIC | Age: 53
End: 2023-02-06
Payer: COMMERCIAL

## 2023-02-06 ENCOUNTER — TELEPHONE (OUTPATIENT)
Dept: FAMILY MEDICINE CLINIC | Age: 53
End: 2023-02-06

## 2023-02-06 VITALS
OXYGEN SATURATION: 98 % | WEIGHT: 132 LBS | SYSTOLIC BLOOD PRESSURE: 110 MMHG | RESPIRATION RATE: 16 BRPM | DIASTOLIC BLOOD PRESSURE: 70 MMHG | HEART RATE: 80 BPM | HEIGHT: 61 IN | BODY MASS INDEX: 24.92 KG/M2

## 2023-02-06 DIAGNOSIS — Z12.31 ENCOUNTER FOR SCREENING MAMMOGRAM FOR MALIGNANT NEOPLASM OF BREAST: ICD-10-CM

## 2023-02-06 DIAGNOSIS — Z23 NEED FOR SHINGLES VACCINE: ICD-10-CM

## 2023-02-06 DIAGNOSIS — G43.009 MIGRAINE WITHOUT AURA AND WITHOUT STATUS MIGRAINOSUS, NOT INTRACTABLE: ICD-10-CM

## 2023-02-06 DIAGNOSIS — E55.9 VITAMIN D DEFICIENCY: ICD-10-CM

## 2023-02-06 DIAGNOSIS — R10.31 RIGHT LOWER QUADRANT ABDOMINAL PAIN: ICD-10-CM

## 2023-02-06 DIAGNOSIS — M62.838 MUSCLE SPASM: ICD-10-CM

## 2023-02-06 DIAGNOSIS — D50.8 OTHER IRON DEFICIENCY ANEMIA: Primary | ICD-10-CM

## 2023-02-06 LAB
DEPRECATED RDW RBC AUTO: 38.5 FL (ref 35–45)
ERYTHROCYTE [DISTWIDTH] IN BLOOD BY AUTOMATED COUNT: 11.9 % (ref 11.5–14.5)
HCT VFR BLD AUTO: 43.1 % (ref 37–47)
HGB BLD-MCNC: 14.1 GM/DL (ref 12–16)
MCH RBC QN AUTO: 29.2 PG (ref 26–33)
MCHC RBC AUTO-ENTMCNC: 32.7 GM/DL (ref 32.2–35.5)
MCV RBC AUTO: 89.2 FL (ref 81–99)
PLATELET # BLD AUTO: 353 THOU/MM3 (ref 130–400)
PMV BLD AUTO: 10.5 FL (ref 9.4–12.4)
RBC # BLD AUTO: 4.83 MILL/MM3 (ref 4.2–5.4)
WBC # BLD AUTO: 6.8 THOU/MM3 (ref 4.8–10.8)

## 2023-02-06 PROCEDURE — G8420 CALC BMI NORM PARAMETERS: HCPCS | Performed by: FAMILY MEDICINE

## 2023-02-06 PROCEDURE — 36415 COLL VENOUS BLD VENIPUNCTURE: CPT | Performed by: FAMILY MEDICINE

## 2023-02-06 PROCEDURE — 90471 IMMUNIZATION ADMIN: CPT | Performed by: FAMILY MEDICINE

## 2023-02-06 PROCEDURE — 3017F COLORECTAL CA SCREEN DOC REV: CPT | Performed by: FAMILY MEDICINE

## 2023-02-06 PROCEDURE — G8427 DOCREV CUR MEDS BY ELIG CLIN: HCPCS | Performed by: FAMILY MEDICINE

## 2023-02-06 PROCEDURE — 90750 HZV VACC RECOMBINANT IM: CPT | Performed by: FAMILY MEDICINE

## 2023-02-06 PROCEDURE — 99214 OFFICE O/P EST MOD 30 MIN: CPT | Performed by: FAMILY MEDICINE

## 2023-02-06 PROCEDURE — G8484 FLU IMMUNIZE NO ADMIN: HCPCS | Performed by: FAMILY MEDICINE

## 2023-02-06 PROCEDURE — 1036F TOBACCO NON-USER: CPT | Performed by: FAMILY MEDICINE

## 2023-02-06 RX ORDER — SUMATRIPTAN 50 MG/1
50 TABLET, FILM COATED ORAL DAILY PRN
Qty: 30 TABLET | Refills: 1 | Status: SHIPPED | OUTPATIENT
Start: 2023-02-06

## 2023-02-06 RX ORDER — ERGOCALCIFEROL 1.25 MG/1
50000 CAPSULE ORAL WEEKLY
Qty: 12 CAPSULE | Refills: 1 | Status: SHIPPED | OUTPATIENT
Start: 2023-02-06

## 2023-02-06 ASSESSMENT — PATIENT HEALTH QUESTIONNAIRE - PHQ9
SUM OF ALL RESPONSES TO PHQ QUESTIONS 1-9: 17
7. TROUBLE CONCENTRATING ON THINGS, SUCH AS READING THE NEWSPAPER OR WATCHING TELEVISION: 3
2. FEELING DOWN, DEPRESSED OR HOPELESS: 2
4. FEELING TIRED OR HAVING LITTLE ENERGY: 3
8. MOVING OR SPEAKING SO SLOWLY THAT OTHER PEOPLE COULD HAVE NOTICED. OR THE OPPOSITE, BEING SO FIGETY OR RESTLESS THAT YOU HAVE BEEN MOVING AROUND A LOT MORE THAN USUAL: 2
SUM OF ALL RESPONSES TO PHQ9 QUESTIONS 1 & 2: 3
10. IF YOU CHECKED OFF ANY PROBLEMS, HOW DIFFICULT HAVE THESE PROBLEMS MADE IT FOR YOU TO DO YOUR WORK, TAKE CARE OF THINGS AT HOME, OR GET ALONG WITH OTHER PEOPLE: 1
3. TROUBLE FALLING OR STAYING ASLEEP: 3
SUM OF ALL RESPONSES TO PHQ QUESTIONS 1-9: 17
5. POOR APPETITE OR OVEREATING: 2
6. FEELING BAD ABOUT YOURSELF - OR THAT YOU ARE A FAILURE OR HAVE LET YOURSELF OR YOUR FAMILY DOWN: 1
9. THOUGHTS THAT YOU WOULD BE BETTER OFF DEAD, OR OF HURTING YOURSELF: 0
1. LITTLE INTEREST OR PLEASURE IN DOING THINGS: 1

## 2023-02-06 ASSESSMENT — ENCOUNTER SYMPTOMS
SHORTNESS OF BREATH: 0
ABDOMINAL PAIN: 1
COUGH: 0
VOMITING: 0
NAUSEA: 0
DIARRHEA: 0

## 2023-02-06 NOTE — PROGRESS NOTES
After obtaining consent, and per orders of Dr. Lowell Garcia, injection of Shingrix given in Right deltoid by Luis Salvador MA. Patient instructed to remain in clinic for 20 minutes afterwards, and to report any adverse reaction to me immediately. Immunizations Administered       Name Date Dose Route    Zoster Recombinant (Shingrix) 2/6/2023 0.5 mL Intramuscular    Site: Deltoid- Right    Lot: [id number]    NDC: 09950-228-65          Vaccine checklist completed.  Patient tolerated injection well

## 2023-02-06 NOTE — PROGRESS NOTES
Attending attestation:  I personally performed and participated key or critical portions of the evaluation and management including personally performing the exam and medical decision making. I verify the accuracy of the documentation by the resident with the following addition or changes: Here for follow-up. Would like iron levels checked. Taking supplement but not every day. Would consider iron infusion if still low. Also getting belly pain after eating in right side. Goes away after bowel movement completely. Normal exam.  Has had a colonoscopy in 2022 that was normal.  History of polyps. Takes Imitrex for headaches. Is stressed and these are a bit more frequent. Usually taking two Imitrex. Will increase dose. 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 A bit of right sided postural neck pain without red flags also. Muscular tension on the right on exam. Has muscle relaxer for as needed use. No intervention desired for PHQ screen positive at this time. Life stressors include custody issue with nephew and work stress and feeling in the middle. Possible seasonal component. Could consider mood light. Will get mammogram and shingles shot.          Electronically signed by Ryan Parish MD on 2/6/2023 at 2:21 PM

## 2023-02-06 NOTE — PROGRESS NOTES
Connie Morgan is a 50 y.o. female who presents today for:  Chief Complaint   Patient presents with    6 Month Follow-Up     Migraines- off and on     Abdominal Pain     RLQ pain- only after eating - denies dysuria or freq     Blood Work     Recheck iron levels          HPI:   Connie Morgan is 50 y.o. who presents today for follow up. Chronic conditions discussed today include:    Migraines: Off and on. Has been stressed recently. Sumatriptan does help when needed. Having msk shoulder pain. Abdominal Pain: The pain is located in the RLQ without radiation. Pain is described as cramping. Aggravating factors include eating. Alleviating factors include bowel movements. Has not had any today. Does not appear to be caused by certain types of foods. Anxiety/Stress: PHQ-9 positive. Patient states she has been dealing with stress regarding family situation and her job. Has been treated with Zoloft previously. Declines treatment at this time. Iron Deficiency: Has been occasionally missing doses of Iron supplementation. She is requesting we recheck her levels.        Objective:     Vitals:    02/06/23 1341   BP: 110/70   Site: Left Upper Arm   Position: Sitting   Pulse: 80   Resp: 16   SpO2: 98%   Weight: 132 lb (59.9 kg)   Height: 5' 1\" (1.549 m)       Wt Readings from Last 3 Encounters:   02/06/23 132 lb (59.9 kg)   12/14/22 127 lb 12.8 oz (58 kg)   08/11/22 123 lb (55.8 kg)       BP Readings from Last 3 Encounters:   02/06/23 110/70   12/14/22 116/76   08/11/22 118/72       Lab Results   Component Value Date    WBC 5.2 08/08/2022    HGB 12.3 08/08/2022    HCT 41.0 08/08/2022    MCV 83.8 08/08/2022     08/08/2022     Lab Results   Component Value Date     08/08/2022    K 4.6 08/08/2022     08/08/2022    CO2 23 08/08/2022    BUN 17 08/08/2022    CREATININE 0.6 08/08/2022    GLUCOSE 80 08/08/2022    CALCIUM 9.2 08/08/2022    PROT 7.2 05/05/2022    LABALBU 3.9 05/21/2022    BILITOT 0.2 05/21/2022    ALKPHOS 75 05/21/2022    AST 26 05/21/2022    ALT 21 05/21/2022    LABGLOM >90 08/08/2022     No results found for: TSH, H3RECWI, C6PCJGG, THYROIDAB, FT3, T4FREE  No results found for: LABA1C  No results found for: EAG  Lab Results   Component Value Date    CHOL 178 05/05/2022     Lab Results   Component Value Date    TRIG 134 05/05/2022     Lab Results   Component Value Date    HDL 77 05/05/2022     Lab Results   Component Value Date    LDLCALC 74 05/05/2022       No results found for: LABMICR, [id number]    Review of Systems   Constitutional:  Positive for fatigue. Negative for fever. Respiratory:  Negative for cough and shortness of breath. Cardiovascular:  Negative for chest pain and palpitations. Gastrointestinal:  Positive for abdominal pain. Negative for diarrhea, nausea and vomiting. Genitourinary:  Negative for frequency and urgency. Neurological:  Positive for headaches. Negative for light-headedness. Psychiatric/Behavioral:  Negative for behavioral problems. The patient is nervous/anxious. Physical Exam  Vitals reviewed. Constitutional:       General: She is not in acute distress. Appearance: She is not ill-appearing. HENT:      Mouth/Throat:      Mouth: Mucous membranes are moist.   Eyes:      General:         Right eye: No discharge. Left eye: No discharge. Cardiovascular:      Rate and Rhythm: Normal rate and regular rhythm. Heart sounds: Normal heart sounds. No murmur heard. No friction rub. No gallop. Pulmonary:      Effort: Pulmonary effort is normal. No respiratory distress. Breath sounds: Normal breath sounds. No stridor. No wheezing, rhonchi or rales. Abdominal:      General: There is no distension. Palpations: Abdomen is soft. Tenderness: There is no abdominal tenderness. There is no guarding or rebound. Skin:     General: Skin is warm. Neurological:      General: No focal deficit present.       Mental Status: She is alert. Mental status is at baseline. Psychiatric:         Mood and Affect: Mood normal.         Behavior: Behavior normal.      Comments: Tearful during exam discussing life stressors        Immunization History   Administered Date(s) Administered    COVID-19, MODERNA BLUE border, Primary or Immunocompromised, (age 12y+), IM, 100 mcg/0.5mL 03/15/2021, 04/12/2021    Influenza Virus Vaccine 11/23/2018, 11/10/2022    Tdap (Boostrix, Adacel) 05/10/2022    Zoster Recombinant (Shingrix) 07/21/2022, 02/06/2023       Health Maintenance Due   Topic Date Due    COVID-19 Vaccine (3 - Booster for Moderna series) 06/07/2021    Breast cancer screen  Never done       Food Insecurity: No Food Insecurity    Worried About Running Out of Food in the Last Year: Never true    Ran Out of Food in the Last Year: Never true       Assessment / Plan:      Diagnosis Orders   1. Other iron deficiency anemia  Zinc 20 MG CAPS    Ferritin    Iron Binding Capacity    Iron    CBC    CBC    Iron    Iron Binding Capacity    Ferritin      2. Migraine without aura and without status migrainosus, not intractable  SUMAtriptan (IMITREX) 50 MG tablet      3. Muscle spasm        4. Vitamin D deficiency  vitamin D (ERGOCALCIFEROL) 1.25 MG (11496 UT) CAPS capsule    Vitamin D 25 Hydroxy    Vitamin D 25 Hydroxy      5. Encounter for screening mammogram for malignant neoplasm of breast  BRIGID DIGITAL SCREEN W OR WO CAD BILATERAL      6. Need for shingles vaccine  Zoster, SHINGRIX, (18 yrs +), IM      7. Right lower quadrant abdominal pain  Tissue Transglutaminase, IgA    Tissue Transglutaminase, IgA        Will check iron levels, Iron Binding Capacity, Ferritin, CBC for iron deficiency anemia  Increase Sumatriptan to 50 mg PRN for recurrent migraines  Abdominal Pain appears benign on exam. ?IBS. Will monitor for now. Check IgA  Recheck Vitamin D and reorder Vitamin D supplementation.    Order mammogram/shingles vaccine           Return in about 3 months (around 5/6/2023). Medications Prescribed:  Orders Placed This Encounter   Medications    vitamin D (ERGOCALCIFEROL) 1.25 MG (28750 UT) CAPS capsule     Sig: Take 1 capsule by mouth once a week     Dispense:  12 capsule     Refill:  1    Zinc 20 MG CAPS     Sig: Take 20 mg by mouth daily     Dispense:  90 capsule     Refill:  3    SUMAtriptan (IMITREX) 50 MG tablet     Sig: Take 1 tablet by mouth daily as needed for Migraine Take one. Can repeat in 2 hours if still with headache. Max 200 mg per 24 hours. Dispense:  30 tablet     Refill:  1       Future Appointments   Date Time Provider Monty Arndt   5/8/2023  1:30 PM Pro Smith MD [address] [facility] [address] [place] [address]       Patient given educational materials - see patient instructions. Discussed use, benefit, and sideeffects of prescribed medications. All patient questions answered. Pt voiced understanding. Reviewed health maintenance. Instructed to continue current medications, diet and exercise. Patient agreed with treatment plan. Follow up as directed. Plan discussed with attending physician.      Electronically signed by Antonia Varner DO on 2/6/2023 at 3:57 PM

## 2023-02-06 NOTE — TELEPHONE ENCOUNTER
Keyanna from [facility] called and they are unable to get Zinc 20mg daily. They do have the 50mg which patient did have in the past. Verbal given to change to the 50mg once daily #90 pills with 3 refills.

## 2023-02-07 LAB
25(OH)D3 SERPL-MCNC: 25 NG/ML (ref 30–100)
FERRITIN SERPL IA-MCNC: 80 NG/ML (ref 10–291)
IRON SERPL-MCNC: 166 UG/DL (ref 50–170)
TIBC SERPL-MCNC: 381 UG/DL (ref 171–450)

## 2023-02-09 LAB — TTG IGA SER IA-ACNC: 0.5 U/ML

## 2023-03-30 ENCOUNTER — OFFICE VISIT (OUTPATIENT)
Dept: FAMILY MEDICINE CLINIC | Age: 53
End: 2023-03-30

## 2023-03-30 VITALS
SYSTOLIC BLOOD PRESSURE: 110 MMHG | HEIGHT: 61 IN | HEART RATE: 76 BPM | OXYGEN SATURATION: 97 % | WEIGHT: 135.8 LBS | DIASTOLIC BLOOD PRESSURE: 70 MMHG | BODY MASS INDEX: 25.64 KG/M2 | RESPIRATION RATE: 16 BRPM

## 2023-03-30 DIAGNOSIS — G89.29 CHRONIC RIGHT SHOULDER PAIN: Primary | ICD-10-CM

## 2023-03-30 DIAGNOSIS — G43.009 MIGRAINE WITHOUT AURA AND WITHOUT STATUS MIGRAINOSUS, NOT INTRACTABLE: ICD-10-CM

## 2023-03-30 DIAGNOSIS — M25.511 CHRONIC RIGHT SHOULDER PAIN: Primary | ICD-10-CM

## 2023-03-30 RX ORDER — CYCLOBENZAPRINE HCL 5 MG
5-10 TABLET ORAL 3 TIMES DAILY PRN
Qty: 30 TABLET | Refills: 1 | Status: SHIPPED | OUTPATIENT
Start: 2023-03-30 | End: 2023-04-09

## 2023-03-30 RX ORDER — SUMATRIPTAN 50 MG/1
50 TABLET, FILM COATED ORAL DAILY PRN
Qty: 18 TABLET | Refills: 1 | Status: SHIPPED | OUTPATIENT
Start: 2023-03-30

## 2023-03-30 ASSESSMENT — ENCOUNTER SYMPTOMS
NAUSEA: 0
SHORTNESS OF BREATH: 0
ABDOMINAL PAIN: 0
VOMITING: 0

## 2023-03-30 NOTE — PROGRESS NOTES
Attending attestation:  I personally performed and participated key or critical portions of the evaluation and management including personally performing the exam and medical decision making. I verify the accuracy of the documentation by the resident with the following addition or changes: Here for shoulder pain. Better than when made appointment 1/10. When scheduled was 9/10. Present for months. Always dull pain. Intermittently sharp and worse. Ibuprofen occasionally helps. Some popping and clicking. Sometimes radiates into headaches. Rarely radiates to elbow. No numbness, tingling, or weakness. Right arm dominant. Also crochets. No trauma. Sometimes uses heat or ice. Full rotation. Mild limitation in abduction due to pain. Discussed options. Consider stretches and physical therapy. Muscle relaxer prn. NSAID prn. Also needs refill on Imitrex. Sent in. Reviewed use. Encouraged mammogram also.         Electronically signed by Radha Augustin MD on 3/30/2023 at 1:24 PM
Take one. Can repeat in 2 hours if still with headache. Max 200 mg per 24 hours. Dispense: 18 tablet; Refill: 1               Return in about 4 weeks (around 4/27/2023). Future Appointments   Date Time Provider Monty Yris   5/8/2023  1:30 PM Tomas Tavares  [address]       Patient given educational materials - see patient instructions. Discussed use, benefit, and sideeffects of prescribed medications. All patient questions answered. Pt voiced understanding. Reviewed health maintenance. Instructed to continue current medications, diet and exercise. Patient agreed with treatment plan. Follow up as directed.      Electronically signed by Og Mercado DO on 3/30/2023 at 2:27 PM

## 2023-05-12 DIAGNOSIS — Z12.31 ENCOUNTER FOR SCREENING MAMMOGRAM FOR MALIGNANT NEOPLASM OF BREAST: ICD-10-CM

## 2023-05-25 ENCOUNTER — OFFICE VISIT (OUTPATIENT)
Dept: FAMILY MEDICINE CLINIC | Age: 53
End: 2023-05-25
Payer: COMMERCIAL

## 2023-05-25 VITALS
WEIGHT: 138.4 LBS | HEART RATE: 80 BPM | HEIGHT: 61 IN | DIASTOLIC BLOOD PRESSURE: 72 MMHG | RESPIRATION RATE: 16 BRPM | BODY MASS INDEX: 26.13 KG/M2 | SYSTOLIC BLOOD PRESSURE: 116 MMHG | OXYGEN SATURATION: 98 %

## 2023-05-25 DIAGNOSIS — E55.9 VITAMIN D DEFICIENCY: ICD-10-CM

## 2023-05-25 DIAGNOSIS — G43.009 MIGRAINE WITHOUT AURA AND WITHOUT STATUS MIGRAINOSUS, NOT INTRACTABLE: ICD-10-CM

## 2023-05-25 DIAGNOSIS — G89.29 CHRONIC RIGHT SHOULDER PAIN: Primary | ICD-10-CM

## 2023-05-25 DIAGNOSIS — F41.9 ANXIETY: ICD-10-CM

## 2023-05-25 DIAGNOSIS — L23.7 POISON IVY: ICD-10-CM

## 2023-05-25 DIAGNOSIS — D50.8 OTHER IRON DEFICIENCY ANEMIA: ICD-10-CM

## 2023-05-25 DIAGNOSIS — Z98.84 STATUS POST BARIATRIC SURGERY: ICD-10-CM

## 2023-05-25 DIAGNOSIS — M25.511 CHRONIC RIGHT SHOULDER PAIN: Primary | ICD-10-CM

## 2023-05-25 PROCEDURE — 3017F COLORECTAL CA SCREEN DOC REV: CPT | Performed by: FAMILY MEDICINE

## 2023-05-25 PROCEDURE — G8427 DOCREV CUR MEDS BY ELIG CLIN: HCPCS | Performed by: FAMILY MEDICINE

## 2023-05-25 PROCEDURE — 1036F TOBACCO NON-USER: CPT | Performed by: FAMILY MEDICINE

## 2023-05-25 PROCEDURE — 99214 OFFICE O/P EST MOD 30 MIN: CPT | Performed by: FAMILY MEDICINE

## 2023-05-25 PROCEDURE — G8419 CALC BMI OUT NRM PARAM NOF/U: HCPCS | Performed by: FAMILY MEDICINE

## 2023-05-25 RX ORDER — CYCLOBENZAPRINE HCL 5 MG
5 TABLET ORAL 2 TIMES DAILY PRN
COMMUNITY
Start: 2023-03-30

## 2023-05-25 RX ORDER — CETIRIZINE HYDROCHLORIDE 10 MG/1
10 TABLET ORAL DAILY PRN
COMMUNITY
Start: 2023-04-11 | End: 2024-04-10

## 2023-05-25 ASSESSMENT — ENCOUNTER SYMPTOMS
SHORTNESS OF BREATH: 0
ABDOMINAL PAIN: 0
VOMITING: 0
NAUSEA: 0

## 2023-05-25 NOTE — PROGRESS NOTES
deficiency  Continue supplemental vitamin D. Set alarm to remember to take weekly. 5. Status post bariatric surgery  Reviewed need for supplements with bariatric surgery. Can take chewable if preferred but quite important to take. 6. Anxiety  Stable off medication. 7. Poison ivy  Declining medication. Would prescribe steroid if desired. Can call back if needed. Return in about 6 months (around 11/25/2023) for Annual wellness visit. Orders Placed   No orders of the defined types were placed in this encounter. Prescriptions given/sent  No orders of the defined types were placed in this encounter. Patient given educational materials - see patient instructions. Discussed use, benefit, and side effects of recommended medications. All patient questions answered. Pt voiced understanding. Reviewed health maintenance. Encouraged COVID-19 booster at pharmacy.             Electronically signed by Elsy Yu MD on 5/25/2023 at 3:41 PM

## 2023-07-20 DIAGNOSIS — E55.9 VITAMIN D DEFICIENCY: ICD-10-CM

## 2023-07-20 RX ORDER — ERGOCALCIFEROL 1.25 MG/1
CAPSULE ORAL
Qty: 12 CAPSULE | Refills: 1 | Status: SHIPPED | OUTPATIENT
Start: 2023-07-20

## 2023-07-20 NOTE — TELEPHONE ENCOUNTER
Patient's last appointment was : 5/25/2023  Patient's next appointment is : Visit date not found  Last sent in: 2/6/23 12 caps with 1 refill

## 2023-07-21 NOTE — TELEPHONE ENCOUNTER
Appointment scheduled. [see HPI] : see HPI [Fever] : no fever [Chills] : no chills [Feeling Tired] : not feeling tired [Sore Throat] : no sore throat [Chest Pain] : no chest pain [Palpitations] : no palpitations [Shortness Of Breath] : no shortness of breath [Cough] : no cough [Abdominal Pain] : no abdominal pain [Vomiting] : no vomiting [Constipation] : no constipation [Diarrhea] : no diarrhea [Confused] : no confusion

## 2023-10-23 ENCOUNTER — OFFICE VISIT (OUTPATIENT)
Dept: FAMILY MEDICINE CLINIC | Age: 53
End: 2023-10-23
Payer: COMMERCIAL

## 2023-10-23 ENCOUNTER — TELEPHONE (OUTPATIENT)
Dept: FAMILY MEDICINE CLINIC | Age: 53
End: 2023-10-23

## 2023-10-23 VITALS
RESPIRATION RATE: 16 BRPM | WEIGHT: 143 LBS | OXYGEN SATURATION: 95 % | HEART RATE: 93 BPM | SYSTOLIC BLOOD PRESSURE: 116 MMHG | BODY MASS INDEX: 27 KG/M2 | HEIGHT: 61 IN | DIASTOLIC BLOOD PRESSURE: 78 MMHG

## 2023-10-23 DIAGNOSIS — F33.1 MODERATE EPISODE OF RECURRENT MAJOR DEPRESSIVE DISORDER (HCC): ICD-10-CM

## 2023-10-23 DIAGNOSIS — H54.60 UNILATERAL VISUAL LOSS: Primary | ICD-10-CM

## 2023-10-23 DIAGNOSIS — R79.89 ELEVATED LFTS: ICD-10-CM

## 2023-10-23 DIAGNOSIS — Z13.220 SCREENING, LIPID: ICD-10-CM

## 2023-10-23 PROCEDURE — 3017F COLORECTAL CA SCREEN DOC REV: CPT | Performed by: STUDENT IN AN ORGANIZED HEALTH CARE EDUCATION/TRAINING PROGRAM

## 2023-10-23 PROCEDURE — 99215 OFFICE O/P EST HI 40 MIN: CPT | Performed by: STUDENT IN AN ORGANIZED HEALTH CARE EDUCATION/TRAINING PROGRAM

## 2023-10-23 PROCEDURE — G8427 DOCREV CUR MEDS BY ELIG CLIN: HCPCS | Performed by: STUDENT IN AN ORGANIZED HEALTH CARE EDUCATION/TRAINING PROGRAM

## 2023-10-23 PROCEDURE — G8484 FLU IMMUNIZE NO ADMIN: HCPCS | Performed by: STUDENT IN AN ORGANIZED HEALTH CARE EDUCATION/TRAINING PROGRAM

## 2023-10-23 PROCEDURE — G8419 CALC BMI OUT NRM PARAM NOF/U: HCPCS | Performed by: STUDENT IN AN ORGANIZED HEALTH CARE EDUCATION/TRAINING PROGRAM

## 2023-10-23 PROCEDURE — 1036F TOBACCO NON-USER: CPT | Performed by: STUDENT IN AN ORGANIZED HEALTH CARE EDUCATION/TRAINING PROGRAM

## 2023-10-23 RX ORDER — FLUOXETINE HYDROCHLORIDE 20 MG/1
20 CAPSULE ORAL DAILY
Qty: 30 CAPSULE | Refills: 3 | Status: SHIPPED | OUTPATIENT
Start: 2023-10-23

## 2023-10-23 ASSESSMENT — ENCOUNTER SYMPTOMS
DIARRHEA: 0
EYE PAIN: 1
VOMITING: 0
CONSTIPATION: 0
COUGH: 0
EYE DISCHARGE: 0
EYE ITCHING: 0
EYE REDNESS: 0
SHORTNESS OF BREATH: 0
PHOTOPHOBIA: 0
NAUSEA: 0

## 2023-10-23 NOTE — TELEPHONE ENCOUNTER
Please let patient know after discussing with Dr. Jaylene Lopez have added a few labs to evaluate why her liver function tests were elevated. If still elevated and no answer we would then order an ultrasound of liver. We can discuss more at next appointment. Thanks!

## 2023-10-23 NOTE — PROGRESS NOTES
[address] [address]  [phone]     Sarita Monterroso is a 48 y.o. female who presents today for:  Chief Complaint   Patient presents with    Follow-Up from Hospital     Had troubles with right eye vision, is better but now right eye is painful. Does have eye doctor but has not called them. Assessment/Plan:     Fidel Randhawa was seen today for follow-up from hospital.    Diagnoses and all orders for this visit:    Unilateral visual loss  -     MRI BRAIN W WO CONTRAST; Future  -     Cancel: Gamma GT; Future  -     TSH; Future  -     T4, Free; Future  -     Sedimentation Rate; Future  -     C-Reactive Protein; Future    Screening, lipid  -     Lipid Panel; Future    Moderate episode of recurrent major depressive disorder (HCC)  -     FLUoxetine (PROZAC) 20 MG capsule; Take 1 capsule by mouth daily    Elevated LFTs  -     Hepatic Function Panel; Future  -     Iron and TIBC; Future  -     Ferritin; Future  -     Hepatitis Panel, Acute; Future      For unilateral visual loss CT and CTA head and neck were negative but will get MRI as above. We will also get the following labs lipids, tsh/t4, esr, crp (GCA concern)  If the above work-up is negative, consider migraine as causative factor. For LFTs, will get hepatic function panel, iron and TIBC, ferritin, acute hepatitis panel. If LFTs still elevated consider liver ultrasound. Greater than 40 minutes was spent in face-to face contact with patient with greater than 50% in counseling and coordination of care. Return in about 4 weeks (around 11/20/2023) for depression.       Medications Prescribed:  Orders Placed This Encounter   Medications    FLUoxetine (PROZAC) 20 MG capsule     Sig: Take 1 capsule by mouth daily     Dispense:  30 capsule     Refill:  3       Future Appointments   Date Time Provider [address]   11/20/2023  1:00 PM Joe Alvarado MD [facility]

## 2023-10-24 ENCOUNTER — NURSE ONLY (OUTPATIENT)
Dept: FAMILY MEDICINE CLINIC | Age: 53
End: 2023-10-24
Payer: COMMERCIAL

## 2023-10-24 DIAGNOSIS — Z13.220 SCREENING, LIPID: ICD-10-CM

## 2023-10-24 DIAGNOSIS — H54.60 UNILATERAL VISUAL LOSS: ICD-10-CM

## 2023-10-24 DIAGNOSIS — R79.89 ELEVATED LFTS: ICD-10-CM

## 2023-10-24 LAB
ALBUMIN SERPL BCG-MCNC: 4.2 G/DL (ref 3.5–5.1)
ALP SERPL-CCNC: 125 U/L (ref 38–126)
ALT SERPL W/O P-5'-P-CCNC: 217 U/L (ref 11–66)
AST SERPL-CCNC: 71 U/L (ref 5–40)
BILIRUB CONJ SERPL-MCNC: < 0.2 MG/DL (ref 0–0.3)
BILIRUB SERPL-MCNC: 1.1 MG/DL (ref 0.3–1.2)
CHOLEST SERPL-MCNC: 208 MG/DL (ref 100–199)
CRP SERPL-MCNC: < 0.3 MG/DL (ref 0–1)
ERYTHROCYTE [SEDIMENTATION RATE] IN BLOOD BY WESTERGREN METHOD: 3 MM/HR (ref 0–20)
FERRITIN SERPL IA-MCNC: 83 NG/ML (ref 10–291)
HAV IGM SER QL: NEGATIVE
HBV CORE IGM SERPL QL IA: NEGATIVE
HBV SURFACE AG SERPL QL IA: NEGATIVE
HCV IGG SERPL QL IA: NEGATIVE
HDLC SERPL-MCNC: 79 MG/DL
IRON SERPL-MCNC: 175 UG/DL (ref 50–170)
LDLC SERPL CALC-MCNC: 109 MG/DL
PROT SERPL-MCNC: 6.9 G/DL (ref 6.1–8)
T4 FREE SERPL-MCNC: 1.27 NG/DL (ref 0.93–1.76)
TIBC SERPL-MCNC: 399 UG/DL (ref 171–450)
TRIGL SERPL-MCNC: 100 MG/DL (ref 0–199)
TSH SERPL DL<=0.005 MIU/L-ACNC: 2.02 UIU/ML (ref 0.4–4.2)

## 2023-10-24 PROCEDURE — 36415 COLL VENOUS BLD VENIPUNCTURE: CPT | Performed by: NURSE PRACTITIONER

## 2023-10-26 ENCOUNTER — TELEPHONE (OUTPATIENT)
Dept: FAMILY MEDICINE CLINIC | Age: 53
End: 2023-10-26

## 2023-10-26 DIAGNOSIS — R79.89 ELEVATED LFTS: Primary | ICD-10-CM

## 2023-10-26 NOTE — TELEPHONE ENCOUNTER
----- Message from Ce Sifuentes MD sent at 10/26/2023 11:13 AM EDT -----  I ordered a thyroid ultrasound. Avoid alcohol and tylenol. Follow-up as planned with Dr. Emilie Lindsey or myself.

## 2023-10-31 ENCOUNTER — TELEPHONE (OUTPATIENT)
Dept: FAMILY MEDICINE CLINIC | Age: 53
End: 2023-10-31

## 2023-10-31 NOTE — TELEPHONE ENCOUNTER
Patient calling in and would like ultrasound faxed to [facility] to have done. However I see a liver ultrasound ordered but patient states that she was told thyroid ultrasound. In the result note it also states thyroid ultrasound. Please verify if you want liver or thyroid ultrasound completed.      Please fax order to [facility]     Please let patient know what test is ordered and when it is faxed

## 2023-11-03 NOTE — TELEPHONE ENCOUNTER
Spoke with patient, has no concerns for her thyroid.  Agrees for Liver US and will go to [facility]

## 2023-11-03 NOTE — TELEPHONE ENCOUNTER
Goodness. I believe this is about a liver ultrasound and that the word thyroid is my error. Thyroid function was normal.  Liver function testing was high. The message also mentioned avoiding tylenol and alcohol which would be needed for elevated liver function testing. I called Kendra to chat but there was no way to leave a message. I am happy to chat with her if questions or other reasons she thinks thyroid imaging is also indicated. Fine to have her call my cell if needed. Thanks.

## 2023-11-14 ENCOUNTER — TELEPHONE (OUTPATIENT)
Dept: FAMILY MEDICINE CLINIC | Age: 53
End: 2023-11-14

## 2023-11-15 ENCOUNTER — HOSPITAL ENCOUNTER (OUTPATIENT)
Dept: ULTRASOUND IMAGING | Age: 53
Discharge: HOME | End: 2023-11-15
Payer: COMMERCIAL

## 2023-11-15 DIAGNOSIS — R79.89 ELEVATED LFTS: ICD-10-CM

## 2023-11-15 PROCEDURE — 76705 ECHO EXAM OF ABDOMEN: CPT

## 2023-11-16 DIAGNOSIS — R16.0 LIVER MASS: Primary | ICD-10-CM

## 2023-11-17 NOTE — TELEPHONE ENCOUNTER
Call patient at [phone] to schedule. Also needs MRI liver after abnormal ultrasound. Willing to schedule both. Prefers at the same time.
Marcella Ellison- it looks like  ordered an MRI Brain for patient on 10/23/23 at her hospital follow up appointment. Are you still wanting patient to have this done? PROVIDER FEEDBACK LOOP CALLED 3X     Saul Miller  : 1970  Referring Provider: Jonnie Valverde  Referral Type:  Imaging     Procedures:  [id number] - MRI BRAIN W WO CONTRAST  Date Service Ordered 10/23/2023        We were unable to reach Melisa Knig to schedule the test ordered by your office after 3 outreach attempts via either text, email and/or phone call. Please call/follow up with your patient to explain the significance of the ordered test and direct the patient to call Central Scheduling to schedule the test at their earliest convenience. Please complete one of the following actions from Quick Actions buttons:     Route to Provider:  Route message to ordering provider to seek next steps in care plan. Telephone Encounter:  Telephone encounter will open. Call patient to explain significance of ordered test and direct patient to call Central Scheduling to schedule test then Document details of call. Open Referral:  Review referral notes or details if needed. Close Referral:  Referral will open. Document in Notes section of referral why the referral is being closed. Examples of referral closure:  Patient had test done outside of of an office in the [address], Patient refuses test, Patient no longer having symptoms, Unable to reach patient. Only close the referral if you are sure the test will not proceed.      Thank you     Pre-Access Scheduling Team
Per department protocol unable to do MRI abdomen and brain on same day. MRI Abdomen scheduled for 11/18/23 and MRI brain scheduled for 12/11/23 both at [facility]. . patient notified of dates, times, location, and prep- patient voiced understanding- no questions at this time
Yes

## 2023-11-18 ENCOUNTER — HOSPITAL ENCOUNTER (OUTPATIENT)
Dept: MRI IMAGING | Age: 53
Discharge: HOME | End: 2023-11-18
Attending: FAMILY MEDICINE
Payer: COMMERCIAL

## 2023-11-18 DIAGNOSIS — R16.0 LIVER MASS: ICD-10-CM

## 2023-11-18 PROCEDURE — 74183 MRI ABD W/O CNTR FLWD CNTR: CPT

## 2023-11-18 PROCEDURE — A9579 GAD-BASE MR CONTRAST NOS,1ML: HCPCS | Performed by: FAMILY MEDICINE

## 2023-11-18 PROCEDURE — 6360000004 HC RX CONTRAST MEDICATION: Performed by: FAMILY MEDICINE

## 2023-11-18 RX ADMIN — GADOTERIDOL 13 ML: 279.3 INJECTION, SOLUTION INTRAVENOUS at 09:31

## 2023-11-20 ENCOUNTER — TELEMEDICINE (OUTPATIENT)
Dept: FAMILY MEDICINE CLINIC | Age: 53
End: 2023-11-20
Payer: COMMERCIAL

## 2023-11-20 DIAGNOSIS — F33.1 MODERATE EPISODE OF RECURRENT MAJOR DEPRESSIVE DISORDER (HCC): ICD-10-CM

## 2023-11-20 DIAGNOSIS — E55.9 VITAMIN D DEFICIENCY: ICD-10-CM

## 2023-11-20 DIAGNOSIS — M25.511 CHRONIC RIGHT SHOULDER PAIN: ICD-10-CM

## 2023-11-20 DIAGNOSIS — R10.11 RIGHT UPPER QUADRANT ABDOMINAL PAIN: Primary | ICD-10-CM

## 2023-11-20 DIAGNOSIS — Z98.84 GASTRIC BYPASS STATUS FOR OBESITY: ICD-10-CM

## 2023-11-20 DIAGNOSIS — Z87.891 PERSONAL HISTORY OF TOBACCO USE: ICD-10-CM

## 2023-11-20 DIAGNOSIS — D18.03 HEMANGIOMA OF INTRA-ABDOMINAL STRUCTURE: ICD-10-CM

## 2023-11-20 DIAGNOSIS — R79.89 ELEVATED LFTS: ICD-10-CM

## 2023-11-20 DIAGNOSIS — G89.29 CHRONIC RIGHT SHOULDER PAIN: ICD-10-CM

## 2023-11-20 PROCEDURE — 99214 OFFICE O/P EST MOD 30 MIN: CPT | Performed by: FAMILY MEDICINE

## 2023-11-20 PROCEDURE — 3017F COLORECTAL CA SCREEN DOC REV: CPT | Performed by: FAMILY MEDICINE

## 2023-11-20 PROCEDURE — G8427 DOCREV CUR MEDS BY ELIG CLIN: HCPCS | Performed by: FAMILY MEDICINE

## 2023-11-20 RX ORDER — FLUOXETINE HYDROCHLORIDE 20 MG/1
20 CAPSULE ORAL DAILY
Qty: 90 CAPSULE | Refills: 1 | Status: SHIPPED | OUTPATIENT
Start: 2023-11-20

## 2023-11-20 RX ORDER — CYCLOBENZAPRINE HCL 5 MG
5 TABLET ORAL 2 TIMES DAILY PRN
Qty: 30 TABLET | Refills: 1 | Status: SHIPPED | OUTPATIENT
Start: 2023-11-20

## 2023-11-20 RX ORDER — ERGOCALCIFEROL 1.25 MG/1
50000 CAPSULE ORAL WEEKLY
Qty: 12 CAPSULE | Refills: 1 | Status: SHIPPED | OUTPATIENT
Start: 2023-11-20

## 2023-11-20 ASSESSMENT — ENCOUNTER SYMPTOMS
NAUSEA: 0
ABDOMINAL PAIN: 1
SHORTNESS OF BREATH: 0
VOMITING: 0
DIARRHEA: 0

## 2023-11-20 NOTE — PROGRESS NOTES
Refill:  1    cyclobenzaprine (FLEXERIL) 5 MG tablet     Sig: Take 1 tablet by mouth 2 times daily as needed for Muscle spasms (shoulder pain)     Dispense:  30 tablet     Refill:  1       Patient given educational materials - see patient instructions. Discussed use, benefit, and side effects of recommended medications. All patient questions answered. Pt voiced understanding. Reviewed health maintenance - encouraged vaccines.               Electronically signed by Lena Sandra MD on 11/20/2023 at 6:18 PM

## 2023-12-11 ENCOUNTER — TELEPHONE (OUTPATIENT)
Dept: FAMILY MEDICINE CLINIC | Age: 53
End: 2023-12-11

## 2023-12-11 ENCOUNTER — HOSPITAL ENCOUNTER (OUTPATIENT)
Age: 53
Discharge: HOME | End: 2023-12-11
Attending: STUDENT IN AN ORGANIZED HEALTH CARE EDUCATION/TRAINING PROGRAM
Payer: COMMERCIAL

## 2023-12-11 ENCOUNTER — HOSPITAL ENCOUNTER (OUTPATIENT)
Dept: MRI IMAGING | Age: 53
Discharge: HOME | End: 2023-12-11
Attending: STUDENT IN AN ORGANIZED HEALTH CARE EDUCATION/TRAINING PROGRAM
Payer: COMMERCIAL

## 2023-12-11 DIAGNOSIS — H54.60 UNILATERAL VISUAL LOSS: ICD-10-CM

## 2023-12-11 LAB
ALBUMIN SERPL BCG-MCNC: 4.3 G/DL (ref 3.5–5.1)
ALP SERPL-CCNC: 113 U/L (ref 38–126)
ALT SERPL W/O P-5'-P-CCNC: 34 U/L (ref 11–66)
AMMONIA PLAS-MCNC: 28 UMOL/L (ref 11–60)
AST SERPL-CCNC: 28 U/L (ref 5–40)
BILIRUB CONJ SERPL-MCNC: < 0.2 MG/DL (ref 0–0.3)
BILIRUB SERPL-MCNC: 0.8 MG/DL (ref 0.3–1.2)
CRP SERPL-MCNC: < 0.3 MG/DL (ref 0–1)
FERRITIN SERPL IA-MCNC: 74 NG/ML (ref 10–291)
IRON SATN MFR SERPL: 30 % (ref 20–50)
IRON SERPL-MCNC: 113 UG/DL (ref 50–170)
PROT SERPL-MCNC: 6.9 G/DL (ref 6.1–8)
TIBC SERPL-MCNC: 372 UG/DL (ref 171–450)
VIT B12 SERPL-MCNC: 770 PG/ML (ref 211–911)

## 2023-12-11 PROCEDURE — 83516 IMMUNOASSAY NONANTIBODY: CPT

## 2023-12-11 PROCEDURE — 70553 MRI BRAIN STEM W/O & W/DYE: CPT

## 2023-12-11 PROCEDURE — 86038 ANTINUCLEAR ANTIBODIES: CPT

## 2023-12-11 PROCEDURE — 82607 VITAMIN B-12: CPT

## 2023-12-11 PROCEDURE — 86140 C-REACTIVE PROTEIN: CPT

## 2023-12-11 PROCEDURE — 6360000004 HC RX CONTRAST MEDICATION: Performed by: STUDENT IN AN ORGANIZED HEALTH CARE EDUCATION/TRAINING PROGRAM

## 2023-12-11 PROCEDURE — A9579 GAD-BASE MR CONTRAST NOS,1ML: HCPCS | Performed by: STUDENT IN AN ORGANIZED HEALTH CARE EDUCATION/TRAINING PROGRAM

## 2023-12-11 PROCEDURE — 82525 ASSAY OF COPPER: CPT

## 2023-12-11 PROCEDURE — 84466 ASSAY OF TRANSFERRIN: CPT

## 2023-12-11 PROCEDURE — 82306 VITAMIN D 25 HYDROXY: CPT

## 2023-12-11 PROCEDURE — 82140 ASSAY OF AMMONIA: CPT

## 2023-12-11 PROCEDURE — 82390 ASSAY OF CERULOPLASMIN: CPT

## 2023-12-11 PROCEDURE — 82728 ASSAY OF FERRITIN: CPT

## 2023-12-11 PROCEDURE — 80076 HEPATIC FUNCTION PANEL: CPT

## 2023-12-11 PROCEDURE — 83540 ASSAY OF IRON: CPT

## 2023-12-11 PROCEDURE — 87902 NFCT AGT GNTYP ALYS HEP C: CPT

## 2023-12-11 PROCEDURE — 87389 HIV-1 AG W/HIV-1&-2 AB AG IA: CPT

## 2023-12-11 PROCEDURE — 82103 ALPHA-1-ANTITRYPSIN TOTAL: CPT

## 2023-12-11 PROCEDURE — 36415 COLL VENOUS BLD VENIPUNCTURE: CPT

## 2023-12-11 RX ADMIN — GADOTERIDOL 13 ML: 279.3 INJECTION, SOLUTION INTRAVENOUS at 07:53

## 2023-12-11 NOTE — TELEPHONE ENCOUNTER
----- Message from Tarun Rolon MD sent at 12/11/2023 11:58 AM EST -----  Message cent to patient mychart

## 2023-12-12 LAB
25(OH)D3 SERPL-MCNC: 16 NG/ML (ref 30–100)
A1AT SERPL-MCNC: 133 MG/DL (ref 90–200)
TRANSFERRIN SERPL-MCNC: 316 MG/DL (ref 200–360)

## 2023-12-13 LAB
CERULOPLASMIN SERPL-MCNC: 24 MG/DL (ref 16–45)
COPPER SERPL-MCNC: 102.8 UG/DL (ref 80–155)
HIV 1+2 AB+HIV1 P24 AG SERPL QL IA: NONREACTIVE
NUCLEAR IGG SER QL IA: NORMAL
SMA IGG SER-ACNC: 4 UNITS (ref 0–19)

## 2023-12-14 LAB
HCV GENTYP SERPL NAA+PROBE: NORMAL
MITOCHONDRIAL M2 AB, IGG: 1.1 U/ML (ref 0–4)

## 2023-12-28 ENCOUNTER — HOSPITAL ENCOUNTER (OUTPATIENT)
Dept: CT IMAGING | Age: 53
Discharge: HOME | End: 2023-12-28
Attending: FAMILY MEDICINE
Payer: COMMERCIAL

## 2023-12-28 DIAGNOSIS — Z87.891 PERSONAL HISTORY OF TOBACCO USE: ICD-10-CM

## 2023-12-28 PROCEDURE — 71271 CT THORAX LUNG CANCER SCR C-: CPT

## 2023-12-31 DIAGNOSIS — I31.39 PERICARDIAL EFFUSION: Primary | ICD-10-CM

## 2024-01-30 ENCOUNTER — HOSPITAL ENCOUNTER (OUTPATIENT)
Age: 54
Discharge: HOME | End: 2024-02-01
Attending: FAMILY MEDICINE
Payer: COMMERCIAL

## 2024-01-30 DIAGNOSIS — I31.39 PERICARDIAL EFFUSION: ICD-10-CM

## 2024-01-30 LAB
ECHO AO ASC DIAM: 2.7 CM
ECHO AV CUSP MM: 1.8 CM
ECHO AV PEAK GRADIENT: 4 MMHG
ECHO AV PEAK VELOCITY: 1 M/S
ECHO AV VELOCITY RATIO: 0.8
ECHO EST RA PRESSURE: 10 MMHG
ECHO IVC PROX: 1.5 CM
ECHO LA AREA 2C: 12.7 CM2
ECHO LA AREA 4C: 11.1 CM2
ECHO LA DIAMETER: 3.1 CM
ECHO LA MAJOR AXIS: 4 CM
ECHO LA MINOR AXIS: 4.2 CM
ECHO LA VOL BP: 28 ML (ref 22–52)
ECHO LA VOL MOD A2C: 32 ML (ref 22–52)
ECHO LA VOL MOD A4C: 23 ML (ref 22–52)
ECHO LV E' LATERAL VELOCITY: 8 CM/S
ECHO LV E' SEPTAL VELOCITY: 7 CM/S
ECHO LV FRACTIONAL SHORTENING: 29 % (ref 28–44)
ECHO LV GLOBAL LONGITUDINAL STRAIN (GLS): -20.4 %
ECHO LV GLOBAL LONGITUDINAL STRAIN (GLS): -21.5 %
ECHO LV GLOBAL LONGITUDINAL STRAIN (GLS): -22.6 %
ECHO LV GLOBAL LONGITUDINAL STRAIN (GLS): -25.9 %
ECHO LV INTERNAL DIMENSION DIASTOLIC: 4.1 CM (ref 3.9–5.3)
ECHO LV INTERNAL DIMENSION SYSTOLIC: 2.9 CM
ECHO LV ISOVOLUMETRIC RELAXATION TIME (IVRT): 67 MS
ECHO LV IVSD: 0.9 CM (ref 0.6–0.9)
ECHO LV MASS 2D: 123 G (ref 67–162)
ECHO LV POSTERIOR WALL DIASTOLIC: 1 CM (ref 0.6–0.9)
ECHO LV RELATIVE WALL THICKNESS RATIO: 0.49
ECHO LVOT PEAK GRADIENT: 3 MMHG
ECHO LVOT PEAK VELOCITY: 0.8 M/S
ECHO MV A VELOCITY: 0.75 M/S
ECHO MV E DECELERATION TIME (DT): 236 MS
ECHO MV E VELOCITY: 0.83 M/S
ECHO MV E/A RATIO: 1.11
ECHO MV E/E' LATERAL: 10.38
ECHO MV E/E' RATIO (AVERAGED): 11.12
ECHO PV MAX VELOCITY: 0.6 M/S
ECHO PV PEAK GRADIENT: 2 MMHG
ECHO RIGHT VENTRICULAR SYSTOLIC PRESSURE (RVSP): 24 MMHG
ECHO RV INTERNAL DIMENSION: 2.6 CM
ECHO RV TAPSE: 2.1 CM (ref 1.7–?)
ECHO TV E WAVE: 0.7 M/S
ECHO TV REGURGITANT MAX VELOCITY: 1.9 M/S
ECHO TV REGURGITANT PEAK GRADIENT: 14 MMHG

## 2024-01-30 PROCEDURE — 93306 TTE W/DOPPLER COMPLETE: CPT | Performed by: INTERNAL MEDICINE

## 2024-01-30 PROCEDURE — 93356 MYOCRD STRAIN IMG SPCKL TRCK: CPT | Performed by: INTERNAL MEDICINE

## 2024-01-30 PROCEDURE — 93306 TTE W/DOPPLER COMPLETE: CPT

## 2024-03-14 DIAGNOSIS — F33.1 MODERATE EPISODE OF RECURRENT MAJOR DEPRESSIVE DISORDER (HCC): ICD-10-CM

## 2024-03-14 DIAGNOSIS — G43.009 MIGRAINE WITHOUT AURA AND WITHOUT STATUS MIGRAINOSUS, NOT INTRACTABLE: ICD-10-CM

## 2024-03-14 RX ORDER — SUMATRIPTAN 50 MG/1
50 TABLET, FILM COATED ORAL DAILY PRN
Qty: 18 TABLET | Refills: 1 | Status: SHIPPED | OUTPATIENT
Start: 2024-03-14

## 2024-03-14 RX ORDER — FLUOXETINE HYDROCHLORIDE 20 MG/1
20 CAPSULE ORAL DAILY
Qty: 90 CAPSULE | Refills: 0 | Status: SHIPPED | OUTPATIENT
Start: 2024-03-14

## 2024-03-14 RX ORDER — CYCLOBENZAPRINE HCL 5 MG
5 TABLET ORAL 2 TIMES DAILY PRN
Qty: 30 TABLET | Refills: 1 | Status: SHIPPED | OUTPATIENT
Start: 2024-03-14

## 2024-03-14 ASSESSMENT — PATIENT HEALTH QUESTIONNAIRE - PHQ9
SUM OF ALL RESPONSES TO PHQ9 QUESTIONS 1 & 2: 2
2. FEELING DOWN, DEPRESSED OR HOPELESS: SEVERAL DAYS
5. POOR APPETITE OR OVEREATING: MORE THAN HALF THE DAYS
9. THOUGHTS THAT YOU WOULD BE BETTER OFF DEAD, OR OF HURTING YOURSELF: NOT AT ALL
SUM OF ALL RESPONSES TO PHQ QUESTIONS 1-9: 9
SUM OF ALL RESPONSES TO PHQ QUESTIONS 1-9: 9
10. IF YOU CHECKED OFF ANY PROBLEMS, HOW DIFFICULT HAVE THESE PROBLEMS MADE IT FOR YOU TO DO YOUR WORK, TAKE CARE OF THINGS AT HOME, OR GET ALONG WITH OTHER PEOPLE: SOMEWHAT DIFFICULT
8. MOVING OR SPEAKING SO SLOWLY THAT OTHER PEOPLE COULD HAVE NOTICED. OR THE OPPOSITE, BEING SO FIGETY OR RESTLESS THAT YOU HAVE BEEN MOVING AROUND A LOT MORE THAN USUAL: SEVERAL DAYS
SUM OF ALL RESPONSES TO PHQ QUESTIONS 1-9: 9
3. TROUBLE FALLING OR STAYING ASLEEP: SEVERAL DAYS
SUM OF ALL RESPONSES TO PHQ QUESTIONS 1-9: 9
4. FEELING TIRED OR HAVING LITTLE ENERGY: SEVERAL DAYS
7. TROUBLE CONCENTRATING ON THINGS, SUCH AS READING THE NEWSPAPER OR WATCHING TELEVISION: SEVERAL DAYS
6. FEELING BAD ABOUT YOURSELF - OR THAT YOU ARE A FAILURE OR HAVE LET YOURSELF OR YOUR FAMILY DOWN: SEVERAL DAYS
1. LITTLE INTEREST OR PLEASURE IN DOING THINGS: SEVERAL DAYS

## 2024-03-14 NOTE — TELEPHONE ENCOUNTER
Patient's last appointment was : 11/20/2023  Patient's next appointment is : Visit date not found  Last sent in: 3/30/23 18 tabs with 1 refills sumatriptan   Flexeril/Prozac 11/20/23         Sent pt messaging stating she is due for f/u appt

## 2024-03-15 ASSESSMENT — PATIENT HEALTH QUESTIONNAIRE - PHQ9
9. THOUGHTS THAT YOU WOULD BE BETTER OFF DEAD, OR OF HURTING YOURSELF: NOT AT ALL
2. FEELING DOWN, DEPRESSED OR HOPELESS: SEVERAL DAYS
8. MOVING OR SPEAKING SO SLOWLY THAT OTHER PEOPLE COULD HAVE NOTICED. OR THE OPPOSITE - BEING SO FIDGETY OR RESTLESS THAT YOU HAVE BEEN MOVING AROUND A LOT MORE THAN USUAL: SEVERAL DAYS
1. LITTLE INTEREST OR PLEASURE IN DOING THINGS: SEVERAL DAYS
SUM OF ALL RESPONSES TO PHQ QUESTIONS 1-9: 9
7. TROUBLE CONCENTRATING ON THINGS, SUCH AS READING THE NEWSPAPER OR WATCHING TELEVISION: SEVERAL DAYS
10. IF YOU CHECKED OFF ANY PROBLEMS, HOW DIFFICULT HAVE THESE PROBLEMS MADE IT FOR YOU TO DO YOUR WORK, TAKE CARE OF THINGS AT HOME, OR GET ALONG WITH OTHER PEOPLE: SOMEWHAT DIFFICULT
5. POOR APPETITE OR OVEREATING: MORE THAN HALF THE DAYS
3. TROUBLE FALLING OR STAYING ASLEEP: SEVERAL DAYS
4. FEELING TIRED OR HAVING LITTLE ENERGY: SEVERAL DAYS
6. FEELING BAD ABOUT YOURSELF - OR THAT YOU ARE A FAILURE OR HAVE LET YOURSELF OR YOUR FAMILY DOWN: SEVERAL DAYS

## 2024-03-18 ENCOUNTER — OFFICE VISIT (OUTPATIENT)
Dept: FAMILY MEDICINE CLINIC | Age: 54
End: 2024-03-18
Payer: COMMERCIAL

## 2024-03-18 VITALS
HEIGHT: 61 IN | BODY MASS INDEX: 27.19 KG/M2 | HEART RATE: 89 BPM | DIASTOLIC BLOOD PRESSURE: 70 MMHG | WEIGHT: 144 LBS | OXYGEN SATURATION: 97 % | RESPIRATION RATE: 16 BRPM | SYSTOLIC BLOOD PRESSURE: 108 MMHG

## 2024-03-18 DIAGNOSIS — D18.03 HEMANGIOMA OF INTRA-ABDOMINAL STRUCTURE: Primary | ICD-10-CM

## 2024-03-18 DIAGNOSIS — F33.1 MODERATE EPISODE OF RECURRENT MAJOR DEPRESSIVE DISORDER (HCC): ICD-10-CM

## 2024-03-18 DIAGNOSIS — G43.009 MIGRAINE WITHOUT AURA AND WITHOUT STATUS MIGRAINOSUS, NOT INTRACTABLE: ICD-10-CM

## 2024-03-18 DIAGNOSIS — D50.8 OTHER IRON DEFICIENCY ANEMIA: ICD-10-CM

## 2024-03-18 DIAGNOSIS — M25.511 RIGHT SHOULDER PAIN, UNSPECIFIED CHRONICITY: ICD-10-CM

## 2024-03-18 PROCEDURE — 99214 OFFICE O/P EST MOD 30 MIN: CPT | Performed by: FAMILY MEDICINE

## 2024-03-18 PROCEDURE — G2211 COMPLEX E/M VISIT ADD ON: HCPCS | Performed by: FAMILY MEDICINE

## 2024-03-18 RX ORDER — SUMATRIPTAN 50 MG/1
50 TABLET, FILM COATED ORAL DAILY PRN
Qty: 18 TABLET | Refills: 1 | Status: SHIPPED | OUTPATIENT
Start: 2024-03-18

## 2024-03-18 RX ORDER — FLUOXETINE HYDROCHLORIDE 20 MG/1
20 CAPSULE ORAL DAILY
Qty: 90 CAPSULE | Refills: 0 | Status: SHIPPED | OUTPATIENT
Start: 2024-03-18

## 2024-03-18 RX ORDER — RIBOFLAVIN (VITAMIN B2) 400 MG
400 TABLET ORAL DAILY
Qty: 30 TABLET | Refills: 1 | Status: SHIPPED | OUTPATIENT
Start: 2024-03-18

## 2024-03-18 RX ORDER — FERROUS SULFATE 325(65) MG
325 TABLET ORAL DAILY
Qty: 90 TABLET | Refills: 1 | Status: SHIPPED | OUTPATIENT
Start: 2024-03-18

## 2024-03-18 SDOH — ECONOMIC STABILITY: FOOD INSECURITY: WITHIN THE PAST 12 MONTHS, YOU WORRIED THAT YOUR FOOD WOULD RUN OUT BEFORE YOU GOT MONEY TO BUY MORE.: NEVER TRUE

## 2024-03-18 SDOH — ECONOMIC STABILITY: FOOD INSECURITY: WITHIN THE PAST 12 MONTHS, THE FOOD YOU BOUGHT JUST DIDN'T LAST AND YOU DIDN'T HAVE MONEY TO GET MORE.: NEVER TRUE

## 2024-03-18 SDOH — ECONOMIC STABILITY: INCOME INSECURITY: HOW HARD IS IT FOR YOU TO PAY FOR THE VERY BASICS LIKE FOOD, HOUSING, MEDICAL CARE, AND HEATING?: NOT VERY HARD

## 2024-03-18 ASSESSMENT — ENCOUNTER SYMPTOMS
SHORTNESS OF BREATH: 0
ABDOMINAL PAIN: 0
BACK PAIN: 0
COUGH: 0
EYE DISCHARGE: 0
VOMITING: 0
SORE THROAT: 0
EYE REDNESS: 0
NAUSEA: 0
CONSTIPATION: 0
DIARRHEA: 0

## 2024-03-18 NOTE — PROGRESS NOTES
hours.  Dispense: 18 tablet; Refill: 1    2. Moderate episode of recurrent major depressive disorder (HCC)  Stable. Refilled Prozac.    - FLUoxetine (PROZAC) 20 MG capsule; Take 1 capsule by mouth daily  Dispense: 90 capsule; Refill: 0    3. Other iron deficiency anemia  Stable on iron. Okay to take every other day if better tolerated at that frequency.    - ferrous sulfate (IRON 325) 325 (65 Fe) MG tablet; Take 1 tablet by mouth daily Takes at least once daily  Dispense: 90 tablet; Refill: 1    4. Hemangioma of intra-abdominal structure  Following with liver specialist. Will need repeat MRI in May.      5. Right shoulder pain, unspecified chronicity  Chronic.  Uses NSAID and muscle relaxer. Had therapy. Would refer for OMT with Dr. Simpson if desired. Will follow.          Return in about 1 month (around 4/18/2024) for Follow-up chronic conditions.    Orders Placed   No orders of the defined types were placed in this encounter.      Prescriptions given/sent  Orders Placed This Encounter   Medications    Riboflavin 400 MG TABS     Sig: Take 400 mg by mouth daily     Dispense:  30 tablet     Refill:  1    FLUoxetine (PROZAC) 20 MG capsule     Sig: Take 1 capsule by mouth daily     Dispense:  90 capsule     Refill:  0    ferrous sulfate (IRON 325) 325 (65 Fe) MG tablet     Sig: Take 1 tablet by mouth daily Takes at least once daily     Dispense:  90 tablet     Refill:  1    SUMAtriptan (IMITREX) 50 MG tablet     Sig: Take 1 tablet by mouth daily as needed for Migraine Take one. Can repeat in 2 hours if still with headache. Max 200 mg per 24 hours.     Dispense:  18 tablet     Refill:  1       Patient given educational materials - see patient instructions.  Discussed use, benefit, and side effects of recommended medications.  All patient questions answered.  Pt voiced understanding.  Reviewed health maintenance.  I do encourage routine immunizations.      Long term patient for whom continuity impacted care.

## 2024-03-25 ENCOUNTER — OFFICE VISIT (OUTPATIENT)
Dept: FAMILY MEDICINE CLINIC | Age: 54
End: 2024-03-25
Payer: COMMERCIAL

## 2024-03-25 VITALS
HEART RATE: 85 BPM | WEIGHT: 147 LBS | SYSTOLIC BLOOD PRESSURE: 108 MMHG | RESPIRATION RATE: 16 BRPM | DIASTOLIC BLOOD PRESSURE: 70 MMHG | BODY MASS INDEX: 27.78 KG/M2 | TEMPERATURE: 98.5 F | OXYGEN SATURATION: 97 %

## 2024-03-25 DIAGNOSIS — G43.009 MIGRAINE WITHOUT AURA AND WITHOUT STATUS MIGRAINOSUS, NOT INTRACTABLE: ICD-10-CM

## 2024-03-25 DIAGNOSIS — S03.40XA SPRAIN OF TEMPOROMANDIBULAR JOINT, INITIAL ENCOUNTER: Primary | ICD-10-CM

## 2024-03-25 PROCEDURE — 99214 OFFICE O/P EST MOD 30 MIN: CPT | Performed by: FAMILY MEDICINE

## 2024-03-25 PROCEDURE — G2211 COMPLEX E/M VISIT ADD ON: HCPCS | Performed by: FAMILY MEDICINE

## 2024-03-25 RX ORDER — CYCLOBENZAPRINE HCL 5 MG
5 TABLET ORAL 2 TIMES DAILY PRN
Qty: 30 TABLET | Refills: 1 | Status: SHIPPED | OUTPATIENT
Start: 2024-03-25

## 2024-03-25 ASSESSMENT — ENCOUNTER SYMPTOMS
EYE REDNESS: 0
COUGH: 0
BACK PAIN: 0
EYE DISCHARGE: 0
SORE THROAT: 0
SHORTNESS OF BREATH: 0

## 2024-03-25 NOTE — PROGRESS NOTES
Skin:     General: Skin is warm and dry.      Comments: No obvious rash.     Neurological:      Mental Status: She is alert.      Comments: No obvious focal deficit.   Psychiatric:         Behavior: Behavior normal.         Lab Results   Component Value Date    WBC 6.8 10/22/2023    HGB 14.3 10/22/2023    HCT 42.7 10/22/2023     10/22/2023    CHOL 208 (H) 10/24/2023    TRIG 100 10/24/2023    HDL 79 10/24/2023    ALT 34 12/11/2023    AST 28 12/11/2023     10/22/2023    K 4.0 10/22/2023     10/22/2023    CREATININE 0.63 10/22/2023    BUN 15 10/22/2023    CO2 24 10/22/2023    TSH 2.020 10/24/2023    INR 0.9 10/22/2023       /Plan:   1. Sprain of temporomandibular joint, initial encounter  Suspect this as diagnosis. Reviewed exercises. Will trial dicolofenac topically, increase naproxen to BID. Add muscle relaxer prn.  Will follow.    - diclofenac sodium (VOLTAREN) 1 % GEL; Apply 2 g topically 4 times daily  Dispense: 350 g; Refill: 1  - cyclobenzaprine (FLEXERIL) 5 MG tablet; Take 1 tablet by mouth 2 times daily as needed for Muscle spasms (shoulder pain)  Dispense: 30 tablet; Refill: 1    2. Migraine without aura and without status migrainosus, not intractable  Just started riboflavin so too early to assess control. Still uncontrolled at present.  Will follow. Re-check in 1 month.          Return in about 1 month (around 4/25/2024) for Follow-up chronic conditions.    Orders Placed   No orders of the defined types were placed in this encounter.      Prescriptions given/sent  Orders Placed This Encounter   Medications    diclofenac sodium (VOLTAREN) 1 % GEL     Sig: Apply 2 g topically 4 times daily     Dispense:  350 g     Refill:  1    cyclobenzaprine (FLEXERIL) 5 MG tablet     Sig: Take 1 tablet by mouth 2 times daily as needed for Muscle spasms (shoulder pain)     Dispense:  30 tablet     Refill:  1       Patient given educational materials - see patient instructions.  Discussed use, benefit,

## 2024-05-07 ENCOUNTER — OFFICE VISIT (OUTPATIENT)
Age: 54
End: 2024-05-07

## 2024-05-07 VITALS
WEIGHT: 144 LBS | TEMPERATURE: 98.3 F | BODY MASS INDEX: 27.19 KG/M2 | RESPIRATION RATE: 16 BRPM | HEART RATE: 66 BPM | OXYGEN SATURATION: 98 % | SYSTOLIC BLOOD PRESSURE: 124 MMHG | HEIGHT: 61 IN | DIASTOLIC BLOOD PRESSURE: 72 MMHG

## 2024-05-07 DIAGNOSIS — L50.9 URTICARIA: ICD-10-CM

## 2024-05-07 DIAGNOSIS — L20.9 ATOPIC DERMATITIS, UNSPECIFIED TYPE: ICD-10-CM

## 2024-05-07 DIAGNOSIS — J30.2 SEASONAL ALLERGIES: Primary | ICD-10-CM

## 2024-05-07 DIAGNOSIS — R12 HEARTBURN: ICD-10-CM

## 2024-05-07 RX ORDER — HYDROXYZINE 50 MG/1
50 TABLET, FILM COATED ORAL EVERY 8 HOURS PRN
Qty: 30 TABLET | Refills: 0 | Status: SHIPPED | OUTPATIENT
Start: 2024-05-07 | End: 2024-05-17

## 2024-05-07 RX ORDER — BETAMETHASONE DIPROPIONATE 0.05 %
OINTMENT (GRAM) TOPICAL
Qty: 15 G | Refills: 0 | Status: SHIPPED | OUTPATIENT
Start: 2024-05-07

## 2024-05-07 RX ORDER — OMEPRAZOLE 20 MG/1
20 CAPSULE, DELAYED RELEASE ORAL DAILY
Qty: 90 CAPSULE | Refills: 0 | Status: SHIPPED | OUTPATIENT
Start: 2024-05-07 | End: 2024-08-05

## 2024-05-07 ASSESSMENT — ENCOUNTER SYMPTOMS
SORE THROAT: 0
COUGH: 0
ABDOMINAL PAIN: 0
EYE ITCHING: 1
NAUSEA: 0
VOMITING: 0
EYE DISCHARGE: 1
CHEST TIGHTNESS: 0
SINUS PRESSURE: 0
PHOTOPHOBIA: 0
EYE REDNESS: 0
SHORTNESS OF BREATH: 0
EYE PAIN: 0

## 2024-05-07 ASSESSMENT — VISUAL ACUITY: OU: 1

## 2024-05-07 NOTE — PROGRESS NOTES
Kendra Henriquez (:  1970) is a 53 y.o. female,Established patient, here for evaluation of the following chief complaint(s):  Other (Allergies)      Assessment & Plan   1. Seasonal allergies  -     hydrOXYzine HCl (ATARAX) 50 MG tablet; Take 1 tablet by mouth every 8 hours as needed for Itching, Disp-30 tablet, R-0Print  2. Heartburn  -     omeprazole (PRILOSEC) 20 MG delayed release capsule; Take 1 capsule by mouth Daily, Disp-90 capsule, R-0Print  3. Atopic dermatitis, unspecified type  -     betamethasone dipropionate 0.05 % ointment; Apply topically daily., Disp-15 g, R-0, Print  4. Urticaria  -     hydrOXYzine HCl (ATARAX) 50 MG tablet; Take 1 tablet by mouth every 8 hours as needed for Itching, Disp-30 tablet, R-0Print      Return if symptoms worsen or fail to improve.     53-year-old female presents to clinic today with seasonal allergy symptoms.  Patient states this morning when she woke up her eyes were really watery.  Patient states she took Claritin this morning but does not take it daily.  Patient also states she needs her medication for omeprazole filled and she has a rash on her arms and shoulders she would like to have checked.  Evaluation erythematous small papules crusting bilateral arms and shoulders.  Discussed treatment plan Atarax 3 times a day as needed for itching or anxiety, betamethasone ointment twice daily, recommended patient to take Claritin daily for at least 30 days, and refilled omeprazole.  Discussed all diagnoses and treatment plans at length with patient all questions answered prior to discharge to patient's satisfaction patient verbalizes understanding.  Recommended patient to follow-up as needed    Subjective   53-year-old female presents to clinic today with seasonal allergy symptoms.        Review of Systems   Constitutional:  Negative for activity change, appetite change and chills.   HENT:  Positive for postnasal drip. Negative for congestion, sinus pressure and  36.6

## 2024-05-07 NOTE — PATIENT INSTRUCTIONS
Take medication as prescribed  Tylenol or ibuprofen for pain or discomfort  Follow up as needed   If symptoms worsen return to clinic or go to ER  Thank you for choosing [facility]

## 2024-08-01 ENCOUNTER — OFFICE VISIT (OUTPATIENT)
Dept: FAMILY MEDICINE CLINIC | Age: 54
End: 2024-08-01

## 2024-08-01 VITALS
HEART RATE: 83 BPM | TEMPERATURE: 96.9 F | OXYGEN SATURATION: 98 % | DIASTOLIC BLOOD PRESSURE: 70 MMHG | SYSTOLIC BLOOD PRESSURE: 102 MMHG | BODY MASS INDEX: 27.08 KG/M2 | HEIGHT: 61 IN | WEIGHT: 143.4 LBS | RESPIRATION RATE: 16 BRPM

## 2024-08-01 DIAGNOSIS — Z13.220 LIPID SCREENING: ICD-10-CM

## 2024-08-01 DIAGNOSIS — D18.03 LIVER HEMANGIOMA: Primary | ICD-10-CM

## 2024-08-01 DIAGNOSIS — Z86.006 PERSONAL HISTORY OF MELANOMA IN-SITU: ICD-10-CM

## 2024-08-01 DIAGNOSIS — F33.42 RECURRENT MAJOR DEPRESSIVE DISORDER, IN FULL REMISSION (HCC): ICD-10-CM

## 2024-08-01 DIAGNOSIS — R21 RASH AND NONSPECIFIC SKIN ERUPTION: ICD-10-CM

## 2024-08-01 DIAGNOSIS — E55.9 VITAMIN D DEFICIENCY: ICD-10-CM

## 2024-08-01 DIAGNOSIS — Z98.84 GASTRIC BYPASS STATUS FOR OBESITY: ICD-10-CM

## 2024-08-01 DIAGNOSIS — R82.998 BROWN-COLORED URINE: ICD-10-CM

## 2024-08-01 DIAGNOSIS — D50.8 OTHER IRON DEFICIENCY ANEMIA: ICD-10-CM

## 2024-08-01 DIAGNOSIS — G43.009 MIGRAINE WITHOUT AURA AND WITHOUT STATUS MIGRAINOSUS, NOT INTRACTABLE: ICD-10-CM

## 2024-08-01 LAB
BILIRUBIN, POC: NEGATIVE
BLOOD URINE, POC: NEGATIVE
CLARITY, POC: CLEAR
COLOR, POC: YELLOW
GLUCOSE URINE, POC: NEGATIVE
KETONES, POC: NEGATIVE
LEUKOCYTE EST, POC: NEGATIVE
NITRITE, POC: NEGATIVE
PH, POC: 5.5
PROTEIN, POC: NEGATIVE
SPECIFIC GRAVITY, POC: 1.01
UROBILINOGEN, POC: 0.2

## 2024-08-01 ASSESSMENT — ENCOUNTER SYMPTOMS
NAUSEA: 0
SINUS PRESSURE: 0
SINUS PAIN: 0
EYES NEGATIVE: 1
CHEST TIGHTNESS: 0
VOMITING: 0
EYE DISCHARGE: 0
ABDOMINAL PAIN: 1
SHORTNESS OF BREATH: 0
BACK PAIN: 0
CONSTIPATION: 0
DIARRHEA: 0
COUGH: 0
SORE THROAT: 0
EYE REDNESS: 0

## 2024-08-01 NOTE — PROGRESS NOTES
[facility] PROFESSIONAL SERVS  [facility]  [address] [address]  Dept: [phone]  Dept Fax: [phone]  Loc: [phone]      Kendra Henriquez is a 54 y.o. female who presents todayfor her medical conditions/complaints as noted below.  Kendra Henriquez is c/o of Skin Problem (Notes her skin constantly breaks out ongoing 2 yrs, notes is just getting over position ivy. /Arm, back, scalp is mostly where she is breaking out. Sometimes itchy. Would like a referral to dermatology )      :     Skin Problem  Pertinent negatives include no congestion, cough, diarrhea, fatigue, fever, shortness of breath, sore throat or vomiting.     Here for follow-up today.    Also concerned for rash. This has been off and on for 2 years. Also recovering from poison ivy. Rash is itchy and picks at this.      Interested in Dermatology referral.  History of Melanoma in situ also.  Steroid cream not really helping.     No clear trigger.  No celiac disease history.      On multivitamins with bypass history.  These are hard to keep track of.      Mood stable.    Migraines stable.      Repeat MRI liver ordered. Status post cholecystectomy; hemangiomas on last.      Reports tea colored am urine.  Intermittent right upper quadrant pain persists.  None since last week.     Depression improved with exercise. Preparing for 20 mile hike.      Anxiety persists.     Off Prozac for 2 months. Stopped since feeling well.    Occasional insomnia.      Uses Prilosec as needed. Not taking daily. Maybe 3 times a week with heartburn.      Patient Active Problem List   Diagnosis    Anxiety    Depression    Heartburn    Migraine without aura and without status migrainosus, not intractable    Biliary disease    Gastric bypass status for obesity    Other seborrheic keratosis    Gastro-esophageal reflux disease without esophagitis    Melanoma in situ (HCC)    Status post bariatric surgery    Acne excoriee    
Health Maintenance Due   Topic Date Due    Hepatitis B vaccine (1 of 3 - 3-dose series) Never done    COVID-19 Vaccine (3 - 2023-24 season) 09/01/2023    Flu vaccine (1) 08/01/2024     Patient is not interested in vaccines today.  
0.63 10/22/2023    BUN 15 10/22/2023    CO2 24 10/22/2023    TSH 2.020 10/24/2023    INR 0.9 10/22/2023       /Plan:   1. Liver hemangioma  - MRI ABDOMEN WO CONTRAST; Future  -May be contributing to sharp abdominal pain if a small hemangioma burst, pt educated on the importance of monitoring symptoms of liver capsule inflammation  -Liver function tests: future    2. Recurrent major depressive disorder, in full remission (HCC)  - Comprehensive Metabolic Panel; Future  - CBC with Auto Differential; Future  - TSH with Reflex; Future    3. Migraine without aura and without status migrainosus, not intractable  -Controlled    4. Other iron deficiency anemia  -Iron studies to be drawn at next visit    5. Vitamin D deficiency  - Vitamin D 25 Hydroxy; Future    6. Gastric bypass status for obesity  - Comprehensive Metabolic Panel; Future  - CBC with Auto Differential; Future  - Zinc; Future  - Copper, Serum; Future  - Iron Binding Capacity; Future  - Iron; Future  - Iron Saturation; Future  - Ferritin; Future    7. Rash and nonspecific skin eruption  - Munir Mcdonough DO, Dermatology, [place]  - Tissue Transglutaminase, IgA; Future    8. Brown-colored urine  - POCT Urinalysis No Micro (Auto)  -Unlikely to be UTI-related due to lack of dysuria or frequent, Paroxysmal Nocturnal Hemoglobinuria on differential due to urine discoloration only in the morning and history of anemia but likelihood is slim.     9. Lipid screening  - Lipid Panel; Future    10. Personal history of melanoma in-situ  - Munir Mcdonough DO, Dermatology, [place]      Return in about 4 months (around 12/1/2024) for Follow-up chronic conditions.    Orders Placed   Orders Placed This Encounter   Procedures    MRI ABDOMEN WO CONTRAST     Standing Status:   Future     Standing Expiration Date:   8/1/2025     Order Specific Question:   Reason for exam:     Answer:   Follow-up liver hemangioma     Order Specific Question:   What is the sedation requirement?

## 2024-08-26 ENCOUNTER — TELEPHONE (OUTPATIENT)
Dept: FAMILY MEDICINE CLINIC | Age: 54
End: 2024-08-26

## 2024-08-26 DIAGNOSIS — D18.03 LIVER HEMANGIOMA: Primary | ICD-10-CM

## 2024-08-26 NOTE — TELEPHONE ENCOUNTER
[facility] calling to let us know that the order for MRI abdomen needs to be changed to MRI abdomen with and without contrast

## 2024-08-27 ENCOUNTER — TELEPHONE (OUTPATIENT)
Dept: FAMILY MEDICINE CLINIC | Age: 54
End: 2024-08-27

## 2024-08-27 ENCOUNTER — OFFICE VISIT (OUTPATIENT)
Dept: FAMILY MEDICINE CLINIC | Age: 54
End: 2024-08-27
Payer: COMMERCIAL

## 2024-08-27 VITALS
RESPIRATION RATE: 18 BRPM | WEIGHT: 144.6 LBS | OXYGEN SATURATION: 96 % | BODY MASS INDEX: 27.32 KG/M2 | HEART RATE: 70 BPM | DIASTOLIC BLOOD PRESSURE: 80 MMHG | SYSTOLIC BLOOD PRESSURE: 112 MMHG

## 2024-08-27 DIAGNOSIS — R05.1 ACUTE COUGH: Primary | ICD-10-CM

## 2024-08-27 PROCEDURE — 99213 OFFICE O/P EST LOW 20 MIN: CPT | Performed by: STUDENT IN AN ORGANIZED HEALTH CARE EDUCATION/TRAINING PROGRAM

## 2024-08-27 NOTE — TELEPHONE ENCOUNTER
3rd attempt- LVM for pt to call Derm office to schedule and reach back out if needing further assistance. CX referral at this time.

## 2024-08-27 NOTE — PROGRESS NOTES
[facility] PROFESSIONAL [facility]  [address] [address]  [phone]     Kendra Henriquez is a 54 y.o. female who presents today for:  Chief Complaint   Patient presents with    Cough     X 1 week, constant the past week, starting to get better the past day, almost cancelled appt today. Took at home covid and negative. Taking OTC mucinex.    Headache     X 1 week, first few days.     Pharyngitis     X 1 week, thinking its from the cough.        Assessment/Plan:     Kendra was seen today for cough, headache and pharyngitis.    Diagnoses and all orders for this visit:    Acute cough      Cough: Acute/uncontrolled, most likely viral URI versus postnasal drip.  Will treat for postnasal drip with Flonase 2 puffs per nostril twice daily for 1 week then once daily for 1 week.           No follow-ups on file.      Medications Prescribed:  No orders of the defined types were placed in this encounter.      Future Appointments   Date Time Provider Department Center   9/16/2024  7:00 AM Lacy Munoz APRN - CNP [facility]   9/16/2024  7:20 AM Lacy Munoz APRN - CNP [facility]       HPI:     HPI  54-year-old female with past med history significant for anemia, ADHD, anxiety, depression, GERD, migraine who presents as acute care visit for cough, headache, sore throat.    Patient states that the cough has been ongoing for the past week, constant.  Although after she made this appointment is starting to get better.  She almost canceled her appointment today.  She took a home COVID test which was negative.  She is taking over-the-counter Mucinex which helps some.  Headache and sore throat have improved but she does state gets worse just from the cough.  No fevers.    Lots of cough drops. Musinex. Steady throughout day.     Subjective:      Review of Systems   Constitutional:  Negative for fatigue and fever.   HENT:  Positive for postnasal

## 2024-08-27 NOTE — PROGRESS NOTES
Health Maintenance Due   Topic Date Due    Hepatitis B vaccine (1 of 3 - 19+ 3-dose series) Never done    COVID-19 Vaccine (3 - 2023-24 season) 09/01/2023    Flu vaccine (1) 08/01/2024

## 2024-08-28 ENCOUNTER — HOSPITAL ENCOUNTER (OUTPATIENT)
Dept: MRI IMAGING | Age: 54
Discharge: HOME | End: 2024-08-28
Attending: FAMILY MEDICINE
Payer: COMMERCIAL

## 2024-08-28 DIAGNOSIS — D18.03 LIVER HEMANGIOMA: ICD-10-CM

## 2024-08-28 LAB — POC CREATININE WHOLE BLOOD: 0.6 MG/DL (ref 0.5–1.2)

## 2024-08-28 PROCEDURE — 6360000004 HC RX CONTRAST MEDICATION: Performed by: FAMILY MEDICINE

## 2024-08-28 PROCEDURE — 74183 MRI ABD W/O CNTR FLWD CNTR: CPT

## 2024-08-28 PROCEDURE — 82565 ASSAY OF CREATININE: CPT

## 2024-08-28 PROCEDURE — A9579 GAD-BASE MR CONTRAST NOS,1ML: HCPCS | Performed by: FAMILY MEDICINE

## 2024-08-28 RX ADMIN — GADOTERIDOL 15 ML: 279.3 INJECTION, SOLUTION INTRAVENOUS at 08:28

## 2024-08-30 ASSESSMENT — ENCOUNTER SYMPTOMS
RHINORRHEA: 0
NAUSEA: 0
COUGH: 1
VOMITING: 0
DIARRHEA: 0
SHORTNESS OF BREATH: 0
SORE THROAT: 1
CONSTIPATION: 0
SINUS PAIN: 0

## 2024-09-12 ENCOUNTER — LAB (OUTPATIENT)
Dept: FAMILY MEDICINE CLINIC | Age: 54
End: 2024-09-12
Payer: COMMERCIAL

## 2024-09-12 DIAGNOSIS — Z98.84 GASTRIC BYPASS STATUS FOR OBESITY: ICD-10-CM

## 2024-09-12 DIAGNOSIS — F33.42 RECURRENT MAJOR DEPRESSIVE DISORDER, IN FULL REMISSION (HCC): ICD-10-CM

## 2024-09-12 DIAGNOSIS — R21 RASH AND NONSPECIFIC SKIN ERUPTION: ICD-10-CM

## 2024-09-12 DIAGNOSIS — E55.9 VITAMIN D DEFICIENCY: ICD-10-CM

## 2024-09-12 DIAGNOSIS — Z13.220 LIPID SCREENING: ICD-10-CM

## 2024-09-12 LAB
ALBUMIN SERPL BCG-MCNC: 4.2 G/DL (ref 3.5–5.1)
ALP SERPL-CCNC: 108 U/L (ref 38–126)
ALT SERPL W/O P-5'-P-CCNC: 31 U/L (ref 11–66)
AST SERPL-CCNC: 33 U/L (ref 5–40)
BILIRUB CONJ SERPL-MCNC: < 0.1 MG/DL (ref 0.1–13.8)
BILIRUB SERPL-MCNC: 0.6 MG/DL (ref 0.3–1.2)
PROT SERPL-MCNC: 6.8 G/DL (ref 6.1–8)

## 2024-09-12 PROCEDURE — 36415 COLL VENOUS BLD VENIPUNCTURE: CPT | Performed by: FAMILY MEDICINE

## 2024-09-13 LAB
25(OH)D3 SERPL-MCNC: 23 NG/ML (ref 30–100)
ALBUMIN SERPL BCG-MCNC: 4.2 G/DL (ref 3.5–5.1)
ALP SERPL-CCNC: 110 U/L (ref 38–126)
ALT SERPL W/O P-5'-P-CCNC: 33 U/L (ref 11–66)
ANION GAP SERPL CALC-SCNC: 14 MEQ/L (ref 8–16)
AST SERPL-CCNC: 31 U/L (ref 5–40)
BILIRUB SERPL-MCNC: 0.6 MG/DL (ref 0.3–1.2)
BUN SERPL-MCNC: 11 MG/DL (ref 7–22)
CALCIUM SERPL-MCNC: 8.8 MG/DL (ref 8.5–10.5)
CHLORIDE SERPL-SCNC: 102 MEQ/L (ref 98–111)
CHOLEST SERPL-MCNC: 201 MG/DL (ref 100–199)
CO2 SERPL-SCNC: 24 MEQ/L (ref 23–33)
CREAT SERPL-MCNC: 0.5 MG/DL (ref 0.4–1.2)
FERRITIN SERPL IA-MCNC: 69 NG/ML (ref 10–291)
GFR SERPL CREATININE-BSD FRML MDRD: > 90 ML/MIN/1.73M2
GLUCOSE SERPL-MCNC: 46 MG/DL (ref 70–108)
HDLC SERPL-MCNC: 64 MG/DL
IRON SATN MFR SERPL: 29 % (ref 20–50)
IRON SERPL-MCNC: 108 UG/DL (ref 50–170)
LDLC SERPL CALC-MCNC: 118 MG/DL
POTASSIUM SERPL-SCNC: 3.8 MEQ/L (ref 3.5–5.2)
PROT SERPL-MCNC: 6.9 G/DL (ref 6.1–8)
SODIUM SERPL-SCNC: 140 MEQ/L (ref 135–145)
TIBC SERPL-MCNC: 375 UG/DL (ref 171–450)
TRIGL SERPL-MCNC: 94 MG/DL (ref 0–199)
TSH SERPL DL<=0.005 MIU/L-ACNC: 1.4 UIU/ML (ref 0.4–4.2)

## 2024-09-16 ENCOUNTER — OFFICE VISIT (OUTPATIENT)
Age: 54
End: 2024-09-16

## 2024-09-16 VITALS
BODY MASS INDEX: 26.62 KG/M2 | HEART RATE: 78 BPM | RESPIRATION RATE: 16 BRPM | DIASTOLIC BLOOD PRESSURE: 72 MMHG | WEIGHT: 141 LBS | TEMPERATURE: 98.3 F | HEIGHT: 61 IN | SYSTOLIC BLOOD PRESSURE: 110 MMHG | OXYGEN SATURATION: 99 %

## 2024-09-16 VITALS — SYSTOLIC BLOOD PRESSURE: 110 MMHG | DIASTOLIC BLOOD PRESSURE: 72 MMHG

## 2024-09-16 DIAGNOSIS — Z00.00 ENCOUNTER FOR WELLNESS EXAMINATION: Primary | ICD-10-CM

## 2024-09-16 DIAGNOSIS — Z00.00 WELLNESS EXAMINATION: Primary | ICD-10-CM

## 2024-09-16 LAB
CHOLESTEROL/HDL RATIO: NORMAL
CHP ED QC CHECK: NORMAL
GLUCOSE BLD-MCNC: 105 MG/DL
HBA1C MFR BLD: 5.1 %
HDLC SERPL-MCNC: 65 MG/DL (ref 35–70)
LDL CHOLESTEROL: NORMAL
NICOTINE: NEGATIVE
REASON FOR REJECTION: NORMAL
REJECTED TEST: NORMAL
SUM TOTAL CHOLESTEROL: NORMAL
TRIGL SERPL-MCNC: 53 MG/DL
VLDLC SERPL CALC-MCNC: NORMAL MG/DL

## 2024-09-16 RX ORDER — PANTOPRAZOLE SODIUM 40 MG/1
40 TABLET, DELAYED RELEASE ORAL DAILY
COMMUNITY
Start: 2024-09-13

## 2024-09-17 LAB — TTG IGA SER IA-ACNC: 0.4 U/ML

## 2024-11-04 ENCOUNTER — OFFICE VISIT (OUTPATIENT)
Dept: FAMILY MEDICINE CLINIC | Age: 54
End: 2024-11-04

## 2024-11-04 VITALS
OXYGEN SATURATION: 98 % | SYSTOLIC BLOOD PRESSURE: 110 MMHG | HEART RATE: 86 BPM | RESPIRATION RATE: 16 BRPM | HEIGHT: 61 IN | BODY MASS INDEX: 26.73 KG/M2 | DIASTOLIC BLOOD PRESSURE: 74 MMHG | WEIGHT: 141.6 LBS

## 2024-11-04 DIAGNOSIS — G43.009 MIGRAINE WITHOUT AURA AND WITHOUT STATUS MIGRAINOSUS, NOT INTRACTABLE: ICD-10-CM

## 2024-11-04 DIAGNOSIS — G89.29 CHRONIC RIGHT SHOULDER PAIN: ICD-10-CM

## 2024-11-04 DIAGNOSIS — Z00.00 WELL WOMAN EXAM (NO GYNECOLOGICAL EXAM): ICD-10-CM

## 2024-11-04 DIAGNOSIS — R12 HEARTBURN: ICD-10-CM

## 2024-11-04 DIAGNOSIS — M25.511 CHRONIC RIGHT SHOULDER PAIN: ICD-10-CM

## 2024-11-04 DIAGNOSIS — L60.1 ONYCHOLYSIS OF TOENAIL: ICD-10-CM

## 2024-11-04 DIAGNOSIS — Z87.891 PERSONAL HISTORY OF TOBACCO USE: ICD-10-CM

## 2024-11-04 DIAGNOSIS — Z23 NEED FOR INFLUENZA VACCINATION: ICD-10-CM

## 2024-11-04 DIAGNOSIS — R79.89 LOW VITAMIN D LEVEL: Primary | ICD-10-CM

## 2024-11-04 DIAGNOSIS — D50.8 OTHER IRON DEFICIENCY ANEMIA: ICD-10-CM

## 2024-11-04 RX ORDER — CYCLOBENZAPRINE HCL 5 MG
5 TABLET ORAL 2 TIMES DAILY PRN
Qty: 30 TABLET | Refills: 1 | Status: SHIPPED | OUTPATIENT
Start: 2024-11-04

## 2024-11-04 RX ORDER — TRIAMCINOLONE ACETONIDE 1 MG/G
OINTMENT TOPICAL
COMMUNITY
Start: 2024-09-17

## 2024-11-04 RX ORDER — FERROUS SULFATE 325(65) MG
325 TABLET ORAL DAILY
Qty: 90 TABLET | Refills: 1 | Status: SHIPPED | OUTPATIENT
Start: 2024-11-04

## 2024-11-04 RX ORDER — SUMATRIPTAN 50 MG/1
50 TABLET, FILM COATED ORAL DAILY PRN
Qty: 18 TABLET | Refills: 1 | Status: SHIPPED | OUTPATIENT
Start: 2024-11-04

## 2024-11-04 RX ORDER — CHOLECALCIFEROL (VITAMIN D3) 125 MCG
5000 CAPSULE ORAL DAILY
Qty: 90 CAPSULE | Refills: 1 | Status: SHIPPED | OUTPATIENT
Start: 2024-11-04

## 2024-11-04 NOTE — PROGRESS NOTES
After obtaining consent, and per orders of Dr. Mann, injection of Flucelvax given in Right deltoid by Sonya Huber MA. Patient instructed to remain in clinic for 20 minutes afterwards, and to report any adverse reaction to me immediately.    Immunizations Administered       Name Date Dose Route    Influenza, FLUCELVAX, (age 6 mo+) IM, Trivalent PF, 0.5mL 11/4/2024 0.5 mL Intramuscular    Site: Deltoid- Right    Lot: [id number]    NDC: 32274-099-99          Pt tolerated injection well. VIS given to pt, vaccine checklist completed.

## 2024-11-04 NOTE — PROGRESS NOTES
[facility] PROFESSIONAL SERVS  [facility]  [address] [address]  Dept: [phone]  Dept Fax: [phone]  Loc: [phone]      Kendra Henriquez is a 54 y.o. female who presents todayfor her medical conditions/complaints as noted below.  Kendra Henriquez is c/o of Other (Pt states she was on a hike a couple months ago and lost toenail off 1 toe and another 1 is causing pain, sx on both feet )      :     History of Present Illness  The patient presents for evaluation of multiple medical concerns.    She reports that during a 20-mile hike in 08/2024, she lost a toenail and another one appears to be on the verge of detachment. She is uncertain if a new toenail is growing underneath. The affected toe is causing her discomfort. She also mentions that her shoes may have been too small, which could have contributed to the issue. She is considering increasing her shoe size to 10.    She is requesting a refill of Flexeril, which she uses as needed for shoulder pain. She occasionally experiences headaches that start in her shoulder and radiate up the back of her head. She is managing well on Imitrex but is unsure if she has any refills left.    She also takes omeprazole as needed but is currently out of it. Her heartburn is well-managed. She has been prescribed Protonix by her gastroenterologist but is not taking it. She takes omeprazole every few days, depending on her diet.    She is taking prescription-strength vitamin D once a week and is requesting a refill of her iron supplement. She is also requesting a refill of her zinc supplement. She has recently started taking vitamins D and C, as well as multivitamins.    She is interested in receiving the influenza vaccine today. She is unsure if she has received the hepatitis B vaccine and would like to check her  records to confirm this.    She saw a dermatologist a few months ago for her melanoma history. She did

## 2024-11-05 ENCOUNTER — TELEPHONE (OUTPATIENT)
Dept: FAMILY MEDICINE CLINIC | Age: 54
End: 2024-11-05

## 2024-11-05 NOTE — TELEPHONE ENCOUNTER
[facility] Pharmacist called and notes they do not have the Zinc 20mg tabs but they do have the 30mg tabets. They would like to know if this is okay to switch too.     Please advise.     [phone]

## 2024-11-20 ENCOUNTER — OFFICE VISIT (OUTPATIENT)
Age: 54
End: 2024-11-20

## 2024-11-20 VITALS
SYSTOLIC BLOOD PRESSURE: 118 MMHG | HEART RATE: 82 BPM | TEMPERATURE: 98.4 F | RESPIRATION RATE: 18 BRPM | WEIGHT: 141 LBS | OXYGEN SATURATION: 100 % | BODY MASS INDEX: 26.62 KG/M2 | DIASTOLIC BLOOD PRESSURE: 74 MMHG | HEIGHT: 61 IN

## 2024-11-20 DIAGNOSIS — H10.402 CHRONIC BACTERIAL CONJUNCTIVITIS OF LEFT EYE: Primary | ICD-10-CM

## 2024-11-20 RX ORDER — TOBRAMYCIN 3 MG/ML
2 SOLUTION/ DROPS OPHTHALMIC EVERY 4 HOURS
Qty: 5 ML | Refills: 0 | Status: SHIPPED | OUTPATIENT
Start: 2024-11-20 | End: 2024-11-27

## 2024-11-20 ASSESSMENT — ENCOUNTER SYMPTOMS
BLURRED VISION: 0
EYE REDNESS: 1
NAUSEA: 0
EYE ITCHING: 0
VOMITING: 0
DOUBLE VISION: 0
EYE DISCHARGE: 1
PHOTOPHOBIA: 0
FOREIGN BODY SENSATION: 1

## 2024-11-20 ASSESSMENT — VISUAL ACUITY: OU: 1

## 2024-11-20 NOTE — PATIENT INSTRUCTIONS
Use medication as prescribed  Tylenol or ibuprofen for pain or discomfort  Follow up as needed   If symptoms worsen return to clinic or go to ER  Thank you for choosing [facility]

## 2024-11-20 NOTE — PROGRESS NOTES
Kendra Henriquez (:  1970) is a 54 y.o. female,Established patient, here for evaluation of the following chief complaint(s):  Possible pink eye and Eye Problem      Assessment & Plan :  Visit Diagnoses and Associated Orders       Chronic bacterial conjunctivitis of left eye    -  Primary    tobramycin (TOBREX) 0.3 % ophthalmic solution [7995]               54-year-old female presents to clinic today with left eye drainage.  Patient states when she woke up this morning she had a light of yellow-green drainage from her left eye, and it feels like sand in her eye.  Evaluation as below    Considered: Conjunctivitis bacterial, viral conjunctivitis,Giant papillary conjunctivitis, and Atopic keratoconjunctivitis   Discussed treatment plan at length with patient 2 drops left eye every 4 hours for 7 days.       Follow up in 5 days if symptoms persist or if symptoms worsen.       Subjective :    Eye Problem   The left eye is affected. This is a new problem. The current episode started today. The problem occurs constantly. The problem has been waxing and waning. There was no injury mechanism. The pain is at a severity of 0/10. The patient is experiencing no pain. There is No known exposure to pink eye. She Does not wear contacts. Associated symptoms include an eye discharge, eye redness and a foreign body sensation. Pertinent negatives include no blurred vision, double vision, fever, itching, nausea, photophobia, recent URI or vomiting. She has tried water for the symptoms. The treatment provided mild relief.        ROS  As documented in HPI.  A thorough 12 point review of systems was evaluated including Constitutional and general appearance, Head, Face, Ears, Eyes, nose, Throat, Cardiovascular,pulmonary, GI, , Skin, and Psychiatric and was found to be negative without symptoms or signs consistent with acute pathology with the exception of that specifically documented in the HPI section of this document.

## 2024-11-25 ENCOUNTER — PROCEDURE VISIT (OUTPATIENT)
Dept: FAMILY MEDICINE CLINIC | Age: 54
End: 2024-11-25

## 2024-11-25 VITALS
DIASTOLIC BLOOD PRESSURE: 70 MMHG | SYSTOLIC BLOOD PRESSURE: 110 MMHG | RESPIRATION RATE: 16 BRPM | HEART RATE: 80 BPM | BODY MASS INDEX: 26.77 KG/M2 | HEIGHT: 61 IN | OXYGEN SATURATION: 98 % | WEIGHT: 141.8 LBS

## 2024-11-25 DIAGNOSIS — L60.1 ONYCHOLYSIS OF TOENAIL: ICD-10-CM

## 2024-11-25 DIAGNOSIS — S91.202S: Primary | ICD-10-CM

## 2024-11-25 RX ORDER — CALCIUM CARBONATE/VITAMIN D3 600 MG-10
1 TABLET ORAL DAILY
COMMUNITY
Start: 2024-11-09

## 2024-11-25 NOTE — PROGRESS NOTES
Patient:Kendra Henriquez  YOB: 1970  MRN: [id number]    Subjective   54 y.o. female who presents for the following: Procedure (Toenail removal )    She was brought to the office today to have a toenail removal. It was thought that the right great toenail was causing her symptoms when the appointment was made. That toe nail appears unstable and is likely to fall off if more trauma is done to it. However, she is having no symptoms with that toenail at this time.     She is having symptoms instead with her left great toenail. She notes tenderness on ambulation on the toe and has been wrapping it with a bandaid and vitamin E oil. She states that it consistently bothers her. There is little toe nail there and appears to be growing healthily, however there is exposed skin that is typically covered by the nail. This pressure and tenderness is likely due to that area being sensitive and not having the covering it normally does.     Discussed with the patient that this likely developed on her hike due to the toe box of her hiking boots. She has ordered new shoes due to this.       Review of Systems   Review of systems was normal except otherwise stated in the HPI    Patient History    Past Medical History:  She has a past medical history of ADHD (attention deficit hyperactivity disorder), Anxiety, Biliary disease, Depression, Gastric bypass status for obesity, GERD (gastroesophageal reflux disease), Hearing loss, Heartburn, Irritable bowel syndrome, Migraine without aura and without status migrainosus, not intractable, Personal history of melanoma in-situ, and Restless legs syndrome.    Social History:  She reports that she quit smoking about 2 years ago. Her smoking use included cigarettes. She started smoking about 34 years ago. She has a 24.3 pack-year smoking history. She has been exposed to tobacco smoke. She has never used smokeless tobacco. She reports that she does not currently use alcohol. She

## 2024-11-25 NOTE — PROGRESS NOTES
Attending attestation:  I personally performed and participated key or critical portions of the evaluation and management including personally performing the exam and medical decision making.  I verify the accuracy of the documentation by the resident with the following addition or changes: Here for toenail removal but on exam the toenail that is painful is actually the one that already fell off. The one that is still present but loose is not causing symptoms. Recommend considering over the counter toenail sleeve to protect nail that is re-growing and getting new footwear with a wider toe box, which patient has on order. Will refer to podiatry for any additional suggestions but would recommend against nail removal since asymptomatic.  Follow-up for chronic conditions as planned.         Electronically signed by Jessie Mann MD on 11/25/2024 at 6:03 PM

## 2024-12-09 ENCOUNTER — LAB (OUTPATIENT)
Dept: FAMILY MEDICINE CLINIC | Age: 54
End: 2024-12-09
Payer: COMMERCIAL

## 2024-12-09 DIAGNOSIS — K76.0 FATTY LIVER: Primary | ICD-10-CM

## 2024-12-09 PROCEDURE — 36415 COLL VENOUS BLD VENIPUNCTURE: CPT | Performed by: FAMILY MEDICINE

## 2024-12-10 LAB
ALBUMIN SERPL BCG-MCNC: 3.9 G/DL (ref 3.5–5.1)
ALP SERPL-CCNC: 116 U/L (ref 38–126)
ALT SERPL W/O P-5'-P-CCNC: 39 U/L (ref 11–66)
AST SERPL-CCNC: 25 U/L (ref 5–40)
BILIRUB CONJ SERPL-MCNC: 0.2 MG/DL (ref 0.1–13.8)
BILIRUB SERPL-MCNC: 0.5 MG/DL (ref 0.3–1.2)
PROT SERPL-MCNC: 6.1 G/DL (ref 6.1–8)

## 2024-12-26 ENCOUNTER — OFFICE VISIT (OUTPATIENT)
Dept: FAMILY MEDICINE CLINIC | Age: 54
End: 2024-12-26

## 2024-12-26 VITALS
OXYGEN SATURATION: 98 % | RESPIRATION RATE: 18 BRPM | SYSTOLIC BLOOD PRESSURE: 128 MMHG | WEIGHT: 136.6 LBS | DIASTOLIC BLOOD PRESSURE: 72 MMHG | BODY MASS INDEX: 25.81 KG/M2 | HEART RATE: 70 BPM

## 2024-12-26 DIAGNOSIS — L03.039 PARONYCHIA OF GREAT TOE: Primary | ICD-10-CM

## 2024-12-26 RX ORDER — MUPIROCIN 20 MG/G
OINTMENT TOPICAL
Qty: 15 G | Refills: 0 | Status: SHIPPED | OUTPATIENT
Start: 2024-12-26 | End: 2025-01-02

## 2024-12-26 ASSESSMENT — ENCOUNTER SYMPTOMS: COLOR CHANGE: 1

## 2024-12-26 NOTE — PROGRESS NOTES
[facility] PROFESSIONAL [facility]  [address] [facility] [place] [address]  [phone]     Kendra Henriquez is a 54 y.o. female who presents today for:  Chief Complaint   Patient presents with    Nail Problem     Left toenail, lost nail and now growing back painful. Wanting referral to podiatry. Referral was placed on 11/25 but has not heard from them.        Assessment/Plan:     Kendra was seen today for nail problem.    Diagnoses and all orders for this visit:    Paronychia of great toe  -     AFL - Kayck, Bridger, DPM, Podiatry, [place]  -     mupirocin (BACTROBAN) 2 % ointment; Apply topically 3 times daily.      Paronychia: Acute/uncontrolled, left great toe, Bactroban and Epsom salt rinses recommended, referral to podiatry as above, call their office if you do not hear from them in several days.           No follow-ups on file.      Medications Prescribed:  Orders Placed This Encounter   Medications    mupirocin (BACTROBAN) 2 % ointment     Sig: Apply topically 3 times daily.     Dispense:  15 g     Refill:  0       Future Appointments   Date Time Provider Department Center   12/30/2024 11:00 AM STR CT IMAGING [facility]  [facility] Rad/Card       HPI:     HPI  54-year-old female with past medical history significant for anemia, anxiety and depression, GERD, migraine who presents as an acute care visit for left toenail issue.    This is her left toenail, it has been going on for about a month.  Has not heard from podiatry.  She did initially lose her toenail and is growing back painful.  She lost it from hiking trip.  Has been using some numbing cream.      Subjective:      Review of Systems   Constitutional:  Negative for fatigue and fever.   Skin:  Positive for color change. Negative for pallor, rash and wound.        Nail bed redness/pain         Objective:     Vitals:    12/26/24 0934   BP: 128/72   Site: Left Upper Arm   Position: Sitting   Pulse: 70

## 2024-12-30 ENCOUNTER — TELEPHONE (OUTPATIENT)
Dept: FAMILY MEDICINE CLINIC | Age: 54
End: 2024-12-30

## 2024-12-30 ENCOUNTER — HOSPITAL ENCOUNTER (OUTPATIENT)
Dept: CT IMAGING | Age: 54
Discharge: HOME | End: 2024-12-30
Attending: FAMILY MEDICINE
Payer: COMMERCIAL

## 2024-12-30 DIAGNOSIS — Z87.891 PERSONAL HISTORY OF TOBACCO USE: ICD-10-CM

## 2024-12-30 PROCEDURE — 71271 CT THORAX LUNG CANCER SCR C-: CPT

## 2024-12-30 NOTE — TELEPHONE ENCOUNTER
----- Message from Dr. Chikis Luo MD sent at 12/30/2024 12:34 PM EST -----  Message sent to patient man  Good afternoon, I reviewed the results of your lung CT.  There were no suspicious masses or lung nodules, they are recommending a repeat lung screening in 12 months

## 2025-02-10 ENCOUNTER — TELEPHONE (OUTPATIENT)
Dept: FAMILY MEDICINE CLINIC | Age: 55
End: 2025-02-10

## 2025-02-10 NOTE — TELEPHONE ENCOUNTER
Insurance sent a message about migraines; taking imitrex often enough could consider adding daily prophylaxis. Have patient schedule follow-up.

## 2025-02-12 NOTE — TELEPHONE ENCOUNTER
Called patient- no answer- left message to return call to office to schedule follow up regarding migraines

## 2025-02-14 SDOH — ECONOMIC STABILITY: FOOD INSECURITY: WITHIN THE PAST 12 MONTHS, YOU WORRIED THAT YOUR FOOD WOULD RUN OUT BEFORE YOU GOT MONEY TO BUY MORE.: NEVER TRUE

## 2025-02-14 SDOH — ECONOMIC STABILITY: FOOD INSECURITY: WITHIN THE PAST 12 MONTHS, THE FOOD YOU BOUGHT JUST DIDN'T LAST AND YOU DIDN'T HAVE MONEY TO GET MORE.: NEVER TRUE

## 2025-02-14 SDOH — ECONOMIC STABILITY: INCOME INSECURITY: IN THE LAST 12 MONTHS, WAS THERE A TIME WHEN YOU WERE NOT ABLE TO PAY THE MORTGAGE OR RENT ON TIME?: NO

## 2025-02-14 SDOH — ECONOMIC STABILITY: TRANSPORTATION INSECURITY
IN THE PAST 12 MONTHS, HAS THE LACK OF TRANSPORTATION KEPT YOU FROM MEDICAL APPOINTMENTS OR FROM GETTING MEDICATIONS?: NO

## 2025-02-14 ASSESSMENT — PATIENT HEALTH QUESTIONNAIRE - PHQ9
SUM OF ALL RESPONSES TO PHQ QUESTIONS 1-9: 6
5. POOR APPETITE OR OVEREATING: NOT AT ALL
2. FEELING DOWN, DEPRESSED OR HOPELESS: SEVERAL DAYS
1. LITTLE INTEREST OR PLEASURE IN DOING THINGS: SEVERAL DAYS
3. TROUBLE FALLING OR STAYING ASLEEP: SEVERAL DAYS
SUM OF ALL RESPONSES TO PHQ QUESTIONS 1-9: 6
1. LITTLE INTEREST OR PLEASURE IN DOING THINGS: SEVERAL DAYS
9. THOUGHTS THAT YOU WOULD BE BETTER OFF DEAD, OR OF HURTING YOURSELF: NOT AT ALL
7. TROUBLE CONCENTRATING ON THINGS, SUCH AS READING THE NEWSPAPER OR WATCHING TELEVISION: MORE THAN HALF THE DAYS
6. FEELING BAD ABOUT YOURSELF - OR THAT YOU ARE A FAILURE OR HAVE LET YOURSELF OR YOUR FAMILY DOWN: NOT AT ALL
SUM OF ALL RESPONSES TO PHQ QUESTIONS 1-9: 6
7. TROUBLE CONCENTRATING ON THINGS, SUCH AS READING THE NEWSPAPER OR WATCHING TELEVISION: MORE THAN HALF THE DAYS
SUM OF ALL RESPONSES TO PHQ QUESTIONS 1-9: 6
4. FEELING TIRED OR HAVING LITTLE ENERGY: SEVERAL DAYS
2. FEELING DOWN, DEPRESSED OR HOPELESS: SEVERAL DAYS
10. IF YOU CHECKED OFF ANY PROBLEMS, HOW DIFFICULT HAVE THESE PROBLEMS MADE IT FOR YOU TO DO YOUR WORK, TAKE CARE OF THINGS AT HOME, OR GET ALONG WITH OTHER PEOPLE: SOMEWHAT DIFFICULT
3. TROUBLE FALLING OR STAYING ASLEEP: SEVERAL DAYS
9. THOUGHTS THAT YOU WOULD BE BETTER OFF DEAD, OR OF HURTING YOURSELF: NOT AT ALL
SUM OF ALL RESPONSES TO PHQ QUESTIONS 1-9: 6
6. FEELING BAD ABOUT YOURSELF - OR THAT YOU ARE A FAILURE OR HAVE LET YOURSELF OR YOUR FAMILY DOWN: NOT AT ALL
SUM OF ALL RESPONSES TO PHQ9 QUESTIONS 1 & 2: 2
8. MOVING OR SPEAKING SO SLOWLY THAT OTHER PEOPLE COULD HAVE NOTICED. OR THE OPPOSITE, BEING SO FIGETY OR RESTLESS THAT YOU HAVE BEEN MOVING AROUND A LOT MORE THAN USUAL: NOT AT ALL
5. POOR APPETITE OR OVEREATING: NOT AT ALL
10. IF YOU CHECKED OFF ANY PROBLEMS, HOW DIFFICULT HAVE THESE PROBLEMS MADE IT FOR YOU TO DO YOUR WORK, TAKE CARE OF THINGS AT HOME, OR GET ALONG WITH OTHER PEOPLE: SOMEWHAT DIFFICULT
8. MOVING OR SPEAKING SO SLOWLY THAT OTHER PEOPLE COULD HAVE NOTICED. OR THE OPPOSITE - BEING SO FIDGETY OR RESTLESS THAT YOU HAVE BEEN MOVING AROUND A LOT MORE THAN USUAL: NOT AT ALL
4. FEELING TIRED OR HAVING LITTLE ENERGY: SEVERAL DAYS

## 2025-02-17 ENCOUNTER — OFFICE VISIT (OUTPATIENT)
Dept: FAMILY MEDICINE CLINIC | Age: 55
End: 2025-02-17

## 2025-02-17 VITALS
HEIGHT: 61 IN | RESPIRATION RATE: 16 BRPM | HEART RATE: 96 BPM | BODY MASS INDEX: 26.09 KG/M2 | SYSTOLIC BLOOD PRESSURE: 118 MMHG | WEIGHT: 138.2 LBS | TEMPERATURE: 97.8 F | DIASTOLIC BLOOD PRESSURE: 74 MMHG | OXYGEN SATURATION: 98 %

## 2025-02-17 DIAGNOSIS — F41.9 ANXIETY: ICD-10-CM

## 2025-02-17 DIAGNOSIS — F32.5 MAJOR DEPRESSION IN REMISSION: ICD-10-CM

## 2025-02-17 DIAGNOSIS — R21 RASH: ICD-10-CM

## 2025-02-17 DIAGNOSIS — G43.009 MIGRAINE WITHOUT AURA AND WITHOUT STATUS MIGRAINOSUS, NOT INTRACTABLE: ICD-10-CM

## 2025-02-17 DIAGNOSIS — D03.62 MELANOMA IN SITU OF LEFT UPPER EXTREMITY INCLUDING SHOULDER (HCC): ICD-10-CM

## 2025-02-17 PROBLEM — F33.1 MAJOR DEPRESSIVE DISORDER, RECURRENT, MODERATE (HCC): Status: ACTIVE | Noted: 2025-02-17

## 2025-02-17 PROBLEM — F33.1 MAJOR DEPRESSIVE DISORDER, RECURRENT, MODERATE (HCC): Status: RESOLVED | Noted: 2025-02-17 | Resolved: 2025-02-17

## 2025-02-17 RX ORDER — VENLAFAXINE HYDROCHLORIDE 75 MG/1
75 CAPSULE, EXTENDED RELEASE ORAL DAILY
Qty: 30 CAPSULE | Refills: 1 | Status: SHIPPED | OUTPATIENT
Start: 2025-02-17

## 2025-02-17 RX ORDER — BACLOFEN 5 MG/1
10 TABLET ORAL 3 TIMES DAILY PRN
Qty: 30 TABLET | Refills: 0 | Status: SHIPPED | OUTPATIENT
Start: 2025-02-17

## 2025-02-17 RX ORDER — PANTOPRAZOLE SODIUM 40 MG/1
40 TABLET, DELAYED RELEASE ORAL DAILY
COMMUNITY
Start: 2025-01-09

## 2025-02-17 NOTE — PROGRESS NOTES
Health Maintenance Due   Topic Date Due    Hepatitis B vaccine (1 of 3 - 19+ 3-dose series) Never done    Pneumococcal 50+ years Vaccine (1 of 1 - PCV) Never done    COVID-19 Vaccine (3 - 2024-25 season) 09/01/2024      
No swelling, deformity, effusion, laceration, bony tenderness or crepitus. Normal range of motion. Normal strength. Normal pulse.      Cervical back: Neck supple.      Comments: Muscle spasm and point tenderness under scapula on right. Otherwise normal exam. Neurovascularly intact distally.     Skin:     General: Skin is warm and dry.      Comments: No obvious rash.     Neurological:      Mental Status: She is alert.      Cranial Nerves: No cranial nerve deficit.      Motor: No weakness.      Gait: Gait normal.      Comments: No obvious focal deficit and normal exam.     Psychiatric:         Mood and Affect: Mood is anxious. Mood is not depressed or elated. Affect is not labile, blunt, flat, angry, tearful or inappropriate.         Speech: Speech normal.         Behavior: Behavior normal.         Thought Content: Thought content normal.         Judgment: Judgment normal.      Comments: Perhaps mildly anxious. Otherwise normal.               Results  Imaging  MRI of brain was negative. Shoulder x-ray showed mild arthritis, no fracture or degenerative change.      /Plan:     Assessment & Plan  1. Headaches.  The headaches may be multifactorial with muscle spasm and migraine components.  , She has been using sumatriptan frequently, which may necessitate a transition to a prophylactic medication.  Venlafaxine 75 mg daily has been prescribed, with the dosage adjustable based on her response. A prescription for baclofen 5 mg has been issued, to be taken as needed. She is advised to maintain a headache diary to monitor the frequency and severity of her headaches. She can continue using sumatriptan for breakthrough symptoms. She is also encouraged to experiment with different sleeping positions to alleviate her shoulder discomfort. If the current treatment regimen proves ineffective, a consultation with a neurologist who specializes in Botox injections may be considered.    2. Anxiety.  She reports significant anxiety

## 2025-03-27 ENCOUNTER — TELEPHONE (OUTPATIENT)
Dept: FAMILY MEDICINE CLINIC | Age: 55
End: 2025-03-27

## 2025-03-27 ENCOUNTER — OFFICE VISIT (OUTPATIENT)
Dept: FAMILY MEDICINE CLINIC | Age: 55
End: 2025-03-27

## 2025-03-27 ENCOUNTER — RESULTS FOLLOW-UP (OUTPATIENT)
Dept: FAMILY MEDICINE CLINIC | Age: 55
End: 2025-03-27

## 2025-03-27 VITALS
HEART RATE: 91 BPM | WEIGHT: 134.8 LBS | SYSTOLIC BLOOD PRESSURE: 112 MMHG | DIASTOLIC BLOOD PRESSURE: 80 MMHG | OXYGEN SATURATION: 97 % | BODY MASS INDEX: 25.88 KG/M2 | RESPIRATION RATE: 18 BRPM

## 2025-03-27 DIAGNOSIS — G89.29 CHRONIC RIGHT SHOULDER PAIN: ICD-10-CM

## 2025-03-27 DIAGNOSIS — R12 HEARTBURN: ICD-10-CM

## 2025-03-27 DIAGNOSIS — Z98.84 HISTORY OF GASTRIC BYPASS: ICD-10-CM

## 2025-03-27 DIAGNOSIS — F33.1 MAJOR DEPRESSIVE DISORDER, RECURRENT, MODERATE (HCC): ICD-10-CM

## 2025-03-27 DIAGNOSIS — Z23 NEED FOR PNEUMOCOCCAL 20-VALENT CONJUGATE VACCINATION: ICD-10-CM

## 2025-03-27 DIAGNOSIS — Z85.820 HISTORY OF MELANOMA: ICD-10-CM

## 2025-03-27 DIAGNOSIS — M25.511 CHRONIC RIGHT SHOULDER PAIN: ICD-10-CM

## 2025-03-27 DIAGNOSIS — R79.89 LOW VITAMIN D LEVEL: ICD-10-CM

## 2025-03-27 DIAGNOSIS — F41.9 ANXIETY: ICD-10-CM

## 2025-03-27 DIAGNOSIS — Z11.59 NEED FOR HEPATITIS B SCREENING TEST: ICD-10-CM

## 2025-03-27 DIAGNOSIS — G43.009 MIGRAINE WITHOUT AURA AND WITHOUT STATUS MIGRAINOSUS, NOT INTRACTABLE: ICD-10-CM

## 2025-03-27 DIAGNOSIS — K58.9 IRRITABLE BOWEL SYNDROME, UNSPECIFIED TYPE: ICD-10-CM

## 2025-03-27 RX ORDER — BACLOFEN 5 MG/1
10 TABLET ORAL 3 TIMES DAILY PRN
Qty: 60 TABLET | Refills: 1 | Status: SHIPPED | OUTPATIENT
Start: 2025-03-27

## 2025-03-27 RX ORDER — VENLAFAXINE HYDROCHLORIDE 150 MG/1
150 CAPSULE, EXTENDED RELEASE ORAL DAILY
Qty: 60 CAPSULE | Refills: 1 | Status: SHIPPED | OUTPATIENT
Start: 2025-03-27

## 2025-03-27 ASSESSMENT — ANXIETY QUESTIONNAIRES
1. FEELING NERVOUS, ANXIOUS, OR ON EDGE: NEARLY EVERY DAY
2. NOT BEING ABLE TO STOP OR CONTROL WORRYING: MORE THAN HALF THE DAYS
7. FEELING AFRAID AS IF SOMETHING AWFUL MIGHT HAPPEN: SEVERAL DAYS
3. WORRYING TOO MUCH ABOUT DIFFERENT THINGS: NOT AT ALL
GAD7 TOTAL SCORE: 10
5. BEING SO RESTLESS THAT IT IS HARD TO SIT STILL: NOT AT ALL
6. BECOMING EASILY ANNOYED OR IRRITABLE: NEARLY EVERY DAY
4. TROUBLE RELAXING: SEVERAL DAYS

## 2025-03-27 NOTE — TELEPHONE ENCOUNTER
Reviewed record from gastroenterology. I am okay with prescribing medications they were prescribing as per note if desired.

## 2025-03-27 NOTE — PROGRESS NOTES
Attending attestation:  I personally performed and participated key or critical portions of the evaluation and management including personally performing the exam and medical decision making.  I verify the accuracy of the documentation by the resident with the following addition or changes:    History of Present Illness  The patient presents for follow-up using baclofen for migraines and right shoulder pain, as well as for anxiety, irritable bowel syndrome, heartburn, vitamin D deficiency, and health maintenance.    She experiences right-sided shoulder pain, which subsequently leads to muscle spasms and migraines. Baclofen has effectively prevented migraines. Initially, the dosage instructions were misinterpreted, leading to the intake of 2 tablets 3 times daily instead of as needed. Upon realizing the mistake, the dosage was adjusted to 1 tablet every other day for shoulder pain. However, she ran out of baclofen and experienced a migraine yesterday. Flexeril is also taken, but infrequently due to its sedative effects. It was taken during the migraine episode yesterday, resulting in excessive sleepiness. No weakness is reported, but there is occasional pinching with certain movements. 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  Would like to just continue baclofen and stop Flexeril.      Venlafaxine was prescribed for migraines and work-related stress and anxiety. No significant change in anxiety levels has been observed since starting the medication 5 weeks ago. High stress at work and difficulty relaxing at home continue. The DENZEL-7 score today is 10. No side effects from venlafaxine are reported, and an increased dose is being considered to better manage anxiety.    Levsin sublingual tablets are taken as needed for irritable bowel syndrome, believed to be prescribed by gastroenterology.   Spasms are not constant but can be triggered by certain foods.  Uses rarely.      A daily regimen of Protonix is taken for

## 2025-03-27 NOTE — RESULT ENCOUNTER NOTE
Good news x-ray unremarkable. Suspect muscle spasm or rotator cuff sprain but partial tear is not excluded. Let me know if pain is worse and I would refer to physical therapy.  If adequately controlled continue baclofen as needed.

## 2025-03-27 NOTE — PROGRESS NOTES
[facility] PROFESSIONAL SERVS  [facility]  [address] [address]  Dept: [phone]  Dept Fax: [phone]  Loc: [phone]  Resident Note    Patient:Kendra Henriquez Sex: female  YOB: 1970 Age:54 y.o.  MRN: [id number]  Assessment & Plan   1. Migraine without aura and without status migrainosus, not intractable  - Chronic, controlled. Significant improvement with Baclofen use for shoulder pain and Venlafaxine for prophylaxis  - Continue Venlafaxine but will increase dose for management of anxiety  - Continue PRN Flexeril and Baclofen  Order  - venlafaxine (EFFEXOR XR) 150 MG extended release capsule; Take 1 capsule by mouth daily  Dispense: 60 capsule; Refill: 1    2. Anxiety  3. Major depressive disorder, recurrent, moderate (F33.1)  - Chronic, suboptimally controlled. GAD7 of 10  - Will increase dose of Venlafaxine   Orders  - venlafaxine (EFFEXOR XR) 150 MG extended release capsule; Take 1 capsule by mouth daily  Dispense: 60 capsule; Refill: 1    4. Chronic right shoulder pain  - Chronic. Present for years. Exam overall reassuring aside from mild tenderness with palpation of posterior glenohumeral joint line and with Empty Can test however no weakness  - Will check shoulder XR. Consider injection for arthritis  - Sending refill of Baclofen 5mg (has helped immensely per patient)  Orders  - Baclofen (LIORESAL) 5 MG tablet; Take 2 tablets by mouth 3 times daily as needed (pain)  Dispense: 60 tablet; Refill: 1  - XR SHOULDER RIGHT (MIN 2 VIEWS); Future    5. History of melanoma  - Follows with dermatology. Derm is suspecting component of OCD / skin picking as picking does worsen at work and small ulcerations noted of R shoulder and back  - Will request records as patient states has started new pill but unsure of name    6. Need for hepatitis B screening test  - Unsure of vaccine status and unable to acquired  / pediatric records  - Will

## 2025-03-27 NOTE — PROGRESS NOTES
After obtaining consent, and per orders of Dr. esposito, injection of [id number] 0.5mL given in Left deltoid by Nuria Batista MA. Patient instructed to remain in clinic for 20 minutes afterwards, and to report any adverse reaction to me immediately.      Immunizations Administered       Name Date Dose Route    Pneumococcal, PCV20, PREVNAR 20, (age 6w+), IM, 0.5mL 3/27/2025 0.5 mL Intramuscular    Site: Deltoid- Left    Lot: [id number]    NDC: 7550-0381-41                Pt tolerated well. VIS was given.

## 2025-04-02 ENCOUNTER — HOSPITAL ENCOUNTER (OUTPATIENT)
Age: 55
Discharge: HOME | End: 2025-04-02
Payer: COMMERCIAL

## 2025-04-02 DIAGNOSIS — Z98.84 HISTORY OF GASTRIC BYPASS: ICD-10-CM

## 2025-04-02 DIAGNOSIS — R79.89 LOW VITAMIN D LEVEL: ICD-10-CM

## 2025-04-02 DIAGNOSIS — Z11.59 NEED FOR HEPATITIS B SCREENING TEST: ICD-10-CM

## 2025-04-02 LAB
25(OH)D3 SERPL-MCNC: 38 NG/ML (ref 30–100)
HBV CORE IGM SERPL QL IA: NONREACTIVE
HBV SURFACE AB TITR SER: < 3.5 MIU/ML
HBV SURFACE AG SERPL QL IA: NONREACTIVE

## 2025-04-02 PROCEDURE — 82306 VITAMIN D 25 HYDROXY: CPT

## 2025-04-02 PROCEDURE — 87340 HEPATITIS B SURFACE AG IA: CPT

## 2025-04-02 PROCEDURE — 82525 ASSAY OF COPPER: CPT

## 2025-04-02 PROCEDURE — 36415 COLL VENOUS BLD VENIPUNCTURE: CPT

## 2025-04-02 PROCEDURE — 86706 HEP B SURFACE ANTIBODY: CPT

## 2025-04-02 PROCEDURE — 86705 HEP B CORE ANTIBODY IGM: CPT

## 2025-04-02 PROCEDURE — 84630 ASSAY OF ZINC: CPT

## 2025-04-03 ENCOUNTER — RESULTS FOLLOW-UP (OUTPATIENT)
Dept: FAMILY MEDICINE CLINIC | Age: 55
End: 2025-04-03

## 2025-04-03 NOTE — RESULT ENCOUNTER NOTE
Normal vitamin D. It looks like you have never gotten your hepatitis B vaccine or ar not immune if you did. I would recommend getting this vaccine series.  Heplisav is in stock at our office and 2 doses.

## 2025-04-04 LAB
COPPER SERPL-MCNC: 103.7 UG/DL (ref 80–155)
ZINC SERPL-MCNC: 62.9 UG/DL (ref 60–120)

## 2025-05-29 ENCOUNTER — OFFICE VISIT (OUTPATIENT)
Dept: FAMILY MEDICINE CLINIC | Age: 55
End: 2025-05-29

## 2025-05-29 VITALS
WEIGHT: 135 LBS | RESPIRATION RATE: 16 BRPM | SYSTOLIC BLOOD PRESSURE: 108 MMHG | HEART RATE: 76 BPM | DIASTOLIC BLOOD PRESSURE: 70 MMHG | BODY MASS INDEX: 25.92 KG/M2

## 2025-05-29 DIAGNOSIS — F41.9 ANXIETY: ICD-10-CM

## 2025-05-29 DIAGNOSIS — G89.29 CHRONIC RIGHT SHOULDER PAIN: ICD-10-CM

## 2025-05-29 DIAGNOSIS — R41.3 MEMORY CHANGE: ICD-10-CM

## 2025-05-29 DIAGNOSIS — R12 HEARTBURN: ICD-10-CM

## 2025-05-29 DIAGNOSIS — Z23 NEED FOR HEPATITIS B VACCINATION: ICD-10-CM

## 2025-05-29 DIAGNOSIS — G43.009 MIGRAINE WITHOUT AURA AND WITHOUT STATUS MIGRAINOSUS, NOT INTRACTABLE: ICD-10-CM

## 2025-05-29 DIAGNOSIS — M25.511 CHRONIC RIGHT SHOULDER PAIN: ICD-10-CM

## 2025-05-29 DIAGNOSIS — Z98.84 HISTORY OF GASTRIC BYPASS: ICD-10-CM

## 2025-05-29 DIAGNOSIS — F33.1 MAJOR DEPRESSIVE DISORDER, RECURRENT, MODERATE (HCC): ICD-10-CM

## 2025-05-29 DIAGNOSIS — D50.8 OTHER IRON DEFICIENCY ANEMIA: ICD-10-CM

## 2025-05-29 DIAGNOSIS — K58.9 IRRITABLE BOWEL SYNDROME, UNSPECIFIED TYPE: ICD-10-CM

## 2025-05-29 RX ORDER — FERROUS SULFATE 325(65) MG
325 TABLET ORAL DAILY
Qty: 90 TABLET | Refills: 1 | Status: SHIPPED | OUTPATIENT
Start: 2025-05-29

## 2025-05-29 RX ORDER — ERGOCALCIFEROL 1.25 MG/1
50000 CAPSULE, LIQUID FILLED ORAL WEEKLY
Qty: 12 CAPSULE | Refills: 1 | Status: SHIPPED | OUTPATIENT
Start: 2025-05-29

## 2025-05-29 RX ORDER — HYDROXYZINE HYDROCHLORIDE 25 MG/1
25 TABLET, FILM COATED ORAL 2 TIMES DAILY
COMMUNITY
Start: 2025-04-30

## 2025-05-29 ASSESSMENT — ENCOUNTER SYMPTOMS
BACK PAIN: 1
GASTROINTESTINAL NEGATIVE: 1
ALLERGIC/IMMUNOLOGIC NEGATIVE: 1
EYES NEGATIVE: 1
RESPIRATORY NEGATIVE: 1

## 2025-05-29 NOTE — PROGRESS NOTES
Attending attestation:  I personally performed and participated in key or critical portions of the evaluation and management including personally verifying the history of present illness and performing the exam and medical decision making.  I verify the accuracy of the documentation by the medical student with the following addition or changes:    History of Present Illness  The patient presents for a 6-week follow-up of chronic conditions and various other concerns.    She reports persistent right shoulder pain, which was previously evaluated with an x-ray revealing joint space narrowing. The exact cause remains uncertain, with possibilities including a sprain or muscle spasm. She has previously attempted physical therapy but found it ineffective in alleviating the pain and is not interested in trying it again. She does not report any associated weakness. She has been managing the pain with baclofen, taking 3 tablets in the morning, which she finds beneficial in preventing muscle spasms. Additionally, she occasionally uses ibuprofen, which provides some relief. She has also undergone treatment of osteopathic manipulative treatment (OMT), which have resulted in significant improvement in muscle hypertonicity in the affected area. 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 She has sufficient refills of baclofen.    She experiences migraines approximately 2 to 3 times per week, which are exacerbated by severe shoulder pain. She uses Imitrex as needed, which she finds effective. She estimates that she uses Imitrex 3 to 4 times per month.    For her anxiety and depression, she has resumed taking Prozac 20 mg, despite previous gastrointestinal side effects, which have since resolved. She is not currently taking Effexor. Her mood has been stable, and her gastrointestinal issues have improved. She reports that Prozac has

## 2025-05-29 NOTE — PROGRESS NOTES
After obtaining consent, and per orders of Dr. esposito, injection of Hep B  given in Left deltoid by MISHEL SHEPARD MA. Patient instructed to remain in clinic for 20 minutes afterwards, and to report any adverse reaction to me immediately.    Immunizations Administered       Name Date Dose Route    Hep B, HEPLISAV-B, (age 18y+), IM, 0.5mL 5/29/2025 0.5 mL Intramuscular    Site: Deltoid- Left    Lot: [id number]    NDC: 05132-098-27          VIS given to patient- vaccine checklist completed. Patient tolerated appropriately

## 2025-06-12 DIAGNOSIS — G89.29 CHRONIC RIGHT SHOULDER PAIN: ICD-10-CM

## 2025-06-12 DIAGNOSIS — M25.511 CHRONIC RIGHT SHOULDER PAIN: ICD-10-CM

## 2025-06-12 NOTE — TELEPHONE ENCOUNTER
Patient's last appointment was : 5/29/2025  Patient's next appointment is : Visit date not found  Last refilled: 03/27/25 60 tab, 1 refill

## 2025-06-13 RX ORDER — BACLOFEN 5 MG/1
10 TABLET ORAL 3 TIMES DAILY PRN
Qty: 60 TABLET | Refills: 1 | Status: SHIPPED | OUTPATIENT
Start: 2025-06-13

## 2025-08-05 ENCOUNTER — OFFICE VISIT (OUTPATIENT)
Dept: FAMILY MEDICINE CLINIC | Age: 55
End: 2025-08-05
Payer: COMMERCIAL

## 2025-08-05 VITALS
WEIGHT: 139.8 LBS | BODY MASS INDEX: 26.84 KG/M2 | OXYGEN SATURATION: 96 % | SYSTOLIC BLOOD PRESSURE: 124 MMHG | DIASTOLIC BLOOD PRESSURE: 66 MMHG | HEART RATE: 67 BPM | RESPIRATION RATE: 18 BRPM
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
## 2025-08-05 DIAGNOSIS — S83.92XA SPRAIN OF LEFT KNEE, UNSPECIFIED LIGAMENT, INITIAL ENCOUNTER: ICD-10-CM

## 2025-08-05 PROCEDURE — 99214 OFFICE O/P EST MOD 30 MIN: CPT | Performed by: NURSE PRACTITIONER

## 2025-08-05 ASSESSMENT — ENCOUNTER SYMPTOMS: COLOR CHANGE: 1

## 2025-08-07 ENCOUNTER — OFFICE VISIT (OUTPATIENT)
Age: 55
End: 2025-08-07

## 2025-08-07 VITALS
WEIGHT: 138 LBS | RESPIRATION RATE: 16 BRPM | HEART RATE: 69 BPM | HEIGHT: 61 IN | OXYGEN SATURATION: 96 % | SYSTOLIC BLOOD PRESSURE: 114 MMHG | DIASTOLIC BLOOD PRESSURE: 70 MMHG | TEMPERATURE: 98.2 F | BODY MASS INDEX: 26.06 KG/M2

## 2025-08-07 VITALS — DIASTOLIC BLOOD PRESSURE: 70 MMHG | SYSTOLIC BLOOD PRESSURE: 114 MMHG

## 2025-08-07 DIAGNOSIS — Z00.00 ENCOUNTER FOR WELLNESS EXAMINATION: Primary | ICD-10-CM

## 2025-08-07 DIAGNOSIS — Z00.00 WELLNESS EXAMINATION: Primary | ICD-10-CM

## 2025-08-07 LAB
CHOLESTEROL/HDL RATIO: ABNORMAL
CHP ED QC CHECK: NORMAL
GLUCOSE BLD-MCNC: 100 MG/DL
HDLC SERPL-MCNC: 78 MG/DL (ref 35–70)
LDL CHOLESTEROL: ABNORMAL
NICOTINE: NEGATIVE
SUM TOTAL CHOLESTEROL: ABNORMAL
TRIGL SERPL-MCNC: 92 MG/DL
VLDLC SERPL CALC-MCNC: ABNORMAL MG/DL

## 2025-08-26 ENCOUNTER — HOSPITAL ENCOUNTER (OUTPATIENT)
Dept: MRI IMAGING | Age: 55
Discharge: HOME | End: 2025-08-26
Attending: FAMILY MEDICINE
Payer: COMMERCIAL

## 2025-08-26 DIAGNOSIS — R41.3 MEMORY CHANGE: ICD-10-CM

## 2025-08-26 PROCEDURE — 70551 MRI BRAIN STEM W/O DYE: CPT
